# Patient Record
Sex: FEMALE | Race: BLACK OR AFRICAN AMERICAN | NOT HISPANIC OR LATINO | Employment: OTHER | URBAN - METROPOLITAN AREA
[De-identification: names, ages, dates, MRNs, and addresses within clinical notes are randomized per-mention and may not be internally consistent; named-entity substitution may affect disease eponyms.]

---

## 2017-01-12 ENCOUNTER — PRENATAL OFFICE VISIT (OUTPATIENT)
Dept: OBGYN | Facility: CLINIC | Age: 27
End: 2017-01-12
Payer: COMMERCIAL

## 2017-01-12 VITALS
TEMPERATURE: 98 F | WEIGHT: 166 LBS | DIASTOLIC BLOOD PRESSURE: 62 MMHG | HEIGHT: 66 IN | BODY MASS INDEX: 26.68 KG/M2 | HEART RATE: 93 BPM | OXYGEN SATURATION: 98 % | SYSTOLIC BLOOD PRESSURE: 122 MMHG

## 2017-01-12 DIAGNOSIS — Z34.03 ENCOUNTER FOR SUPERVISION OF NORMAL FIRST PREGNANCY IN THIRD TRIMESTER: Primary | ICD-10-CM

## 2017-01-12 PROCEDURE — 99207 ZZC PRENATAL VISIT: CPT | Performed by: OBSTETRICS & GYNECOLOGY

## 2017-01-12 PROCEDURE — 87653 STREP B DNA AMP PROBE: CPT | Performed by: OBSTETRICS & GYNECOLOGY

## 2017-01-12 NOTE — NURSING NOTE
"Chief Complaint   Patient presents with     Prenatal Care       Initial /62 mmHg  Pulse 93  Temp(Src) 98  F (36.7  C) (Oral)  Ht 5' 6\" (1.676 m)  Wt 166 lb (75.297 kg)  BMI 26.81 kg/m2  SpO2 98%  LMP 2016 (Exact Date) Estimated body mass index is 26.81 kg/(m^2) as calculated from the following:    Height as of this encounter: 5' 6\" (1.676 m).    Weight as of this encounter: 166 lb (75.297 kg).  BP completed using cuff size: regular        The following HM Due: NONE      The following patient reported/Care Every where data was sent to:  P ABSTRACT QUALITY INITIATIVES [[id number]]       Rani Hubbard CMA                "

## 2017-01-13 LAB
GP B STREP DNA SPEC QL NAA+PROBE: NORMAL
SPECIMEN SOURCE: NORMAL

## 2017-01-18 ENCOUNTER — PRENATAL OFFICE VISIT (OUTPATIENT)
Dept: OBGYN | Facility: CLINIC | Age: 27
End: 2017-01-18
Payer: COMMERCIAL

## 2017-01-18 VITALS
BODY MASS INDEX: 27.64 KG/M2 | TEMPERATURE: 98.8 F | HEART RATE: 108 BPM | WEIGHT: 172 LBS | HEIGHT: 66 IN | SYSTOLIC BLOOD PRESSURE: 124 MMHG | DIASTOLIC BLOOD PRESSURE: 62 MMHG

## 2017-01-18 DIAGNOSIS — Z34.03 ENCOUNTER FOR SUPERVISION OF NORMAL FIRST PREGNANCY IN THIRD TRIMESTER: Primary | ICD-10-CM

## 2017-01-18 DIAGNOSIS — N89.8 VAGINAL DISCHARGE: ICD-10-CM

## 2017-01-18 DIAGNOSIS — N89.8 VAGINAL ITCHING: ICD-10-CM

## 2017-01-18 LAB
MICRO REPORT STATUS: ABNORMAL
SPECIMEN SOURCE: ABNORMAL
WET PREP SPEC: ABNORMAL

## 2017-01-18 PROCEDURE — 99213 OFFICE O/P EST LOW 20 MIN: CPT | Performed by: ADVANCED PRACTICE MIDWIFE

## 2017-01-18 PROCEDURE — 87210 SMEAR WET MOUNT SALINE/INK: CPT | Performed by: ADVANCED PRACTICE MIDWIFE

## 2017-01-18 PROCEDURE — 99207 ZZC PRENATAL VISIT: CPT | Performed by: ADVANCED PRACTICE MIDWIFE

## 2017-01-18 RX ORDER — FLUCONAZOLE 150 MG/1
150 TABLET ORAL ONCE
Qty: 2 TABLET | Refills: 0 | Status: SHIPPED | OUTPATIENT
Start: 2017-01-18 | End: 2017-01-18

## 2017-01-18 NOTE — PROGRESS NOTES
Feels well, getting ready to be done.  Experiencing yeast symptoms again, see below  Fetal movement: positive  Denies bleeding/lof, no contractions  GBS nega  Vaginal exam done, confirmed vertex and vertex via leopolds   Cx:  Closed/50/-1  Warning signs reviewedt   Return to clinic 1 week      Vaginal Itching note  +Itching and discharge again since last visit x1 week    PELVIC EXAM:  Vulva: No external lesions, BUS WNL  Vagina: Moist, pink, discharge consistent with yeast  well rugated, no lesions  Cervix: smooth, pink, no visible lesions, neg CMT  Uterus: Normal size, anteverted, non-tender, mobile  Ovaries: No mass, non-tender  Rectal exam: deferred    Results for orders placed or performed in visit on 01/18/17   Wet prep   Result Value Ref Range    Specimen Description Vagina     Wet Prep (A)      No clue cells seen  Yeast seen  No Trichomonas seen      Micro Report Status FINAL 01/18/2017        ICD-10-CM    1. Encounter for supervision of normal first pregnancy in third trimester Z34.03    2. Vaginal discharge N89.8 Wet prep     fluconazole (DIFLUCAN) 150 MG tablet   3. Vaginal itching L29.8 Wet prep     fluconazole (DIFLUCAN) 150 MG tablet       Rx sent  Reviewed no scented soaps in vaginal area    DANTE Samuel, TRINI

## 2017-01-18 NOTE — NURSING NOTE
"Chief Complaint   Patient presents with     Prenatal Care       Initial /62 mmHg  Pulse 108  Temp(Src) 98.8  F (37.1  C) (Oral)  Ht 5' 6\" (1.676 m)  Wt 172 lb (78.019 kg)  BMI 27.77 kg/m2  LMP 05/16/2016 (Exact Date)  Breastfeeding? No Estimated body mass index is 27.77 kg/(m^2) as calculated from the following:    Height as of this encounter: 5' 6\" (1.676 m).    Weight as of this encounter: 172 lb (78.019 kg).  BP completed using cuff size: regular. Pt. Here for a PN visit. Pt. C/O lower abd. Pain X1 day, pain is intermittent, rates pain at a 1-2. Also, states that she believes her yeast infection is back, C/O vaginal itching and some whitish vaginal discharge.  ONEIL Marquez RN      "

## 2017-01-19 NOTE — PROGRESS NOTES
Quick Note:    Results discussed directly with patient while patient was present. Any further details documented in the note.   DANTE Kay CNM  ______

## 2017-01-24 ENCOUNTER — PRENATAL OFFICE VISIT (OUTPATIENT)
Dept: OBGYN | Facility: CLINIC | Age: 27
End: 2017-01-24
Payer: COMMERCIAL

## 2017-01-24 VITALS
OXYGEN SATURATION: 100 % | TEMPERATURE: 98.4 F | WEIGHT: 169 LBS | SYSTOLIC BLOOD PRESSURE: 120 MMHG | HEART RATE: 99 BPM | BODY MASS INDEX: 27.29 KG/M2 | DIASTOLIC BLOOD PRESSURE: 68 MMHG

## 2017-01-24 DIAGNOSIS — Z34.03 ENCOUNTER FOR SUPERVISION OF NORMAL FIRST PREGNANCY IN THIRD TRIMESTER: Primary | ICD-10-CM

## 2017-01-24 PROCEDURE — 99207 ZZC PRENATAL VISIT: CPT | Performed by: OBSTETRICS & GYNECOLOGY

## 2017-01-24 NOTE — NURSING NOTE
"Chief Complaint   Patient presents with     Prenatal Care       Initial LMP 2016 (Exact Date) Estimated body mass index is 27.77 kg/(m^2) as calculated from the following:    Height as of 17: 5' 6\" (1.676 m).    Weight as of 17: 172 lb (78.019 kg).  BP completed using cuff size: regular        The following HM Due: NONE      The following patient reported/Care Every where data was sent to:  P ABSTRACT QUALITY INITIATIVES [[id number]]       Pt is having mild contractions  1-2 at a time a couple times a day  Pt states having good movement  37w1d      Rani Hubbard CMA                 "

## 2017-01-24 NOTE — PATIENT INSTRUCTIONS
You can reach your [facility] Care Team any time of the day by calling [phone]. This number will put you in touch with the 24 hour nurse line if the clinic is closed.    To contact your OB/GYN Surgery Scheduler please call [phone]. This is a direct number for your care team between 8 a.m. and 4 p.m. Monday through Friday.    [facility] is open for your convenience: [phone]  Monday through Friday 8 a.m. to 8:30 p.m.  Saturday 9 a.m. to 6 p.m.  Sunday Noon to 6 p.m.    They are closed on all major holidays.

## 2017-01-24 NOTE — PROGRESS NOTES
Thanh is doing well today. No vaginal itching since 2 doses of diflucan last week. Good FM. Mild contractions. No vaginal bleeding or leakage of fluid  /68 mmHg  Pulse 99  Temp(Src) 98.4  F (36.9  C) (Oral)  Wt 169 lb (76.658 kg)  SpO2 100%  LMP 2016 (Exact Date)   See flowsheet    Thanh Douglas is a 26 year old female  at 37w1d by 11w2d US  - GBS negative  - 5 yeast infections this pregnancy, symptoms do resolve briefly with treatment. Consider gentian violet PP if infections persist  - return to clinic in 1 week    Laurel Diggs MD

## 2017-02-01 ENCOUNTER — PRENATAL OFFICE VISIT (OUTPATIENT)
Dept: OBGYN | Facility: CLINIC | Age: 27
End: 2017-02-01
Payer: COMMERCIAL

## 2017-02-01 VITALS
DIASTOLIC BLOOD PRESSURE: 58 MMHG | HEART RATE: 78 BPM | SYSTOLIC BLOOD PRESSURE: 102 MMHG | BODY MASS INDEX: 27.61 KG/M2 | WEIGHT: 171 LBS | OXYGEN SATURATION: 99 %

## 2017-02-01 DIAGNOSIS — Z34.03 ENCOUNTER FOR SUPERVISION OF NORMAL FIRST PREGNANCY IN THIRD TRIMESTER: Primary | ICD-10-CM

## 2017-02-01 PROCEDURE — 99207 ZZC PRENATAL VISIT: CPT | Performed by: ADVANCED PRACTICE MIDWIFE

## 2017-02-01 NOTE — PROGRESS NOTES
Feels well, no complaints, feels like baby has dropped  Feels like yeast symptoms have finally cleared up  Fetal movement: positive  Denies vaginal bleeding/lof, no contractions  Warning signs reviewed   Wishes cervical exam  Denies s/sx of preeclampsia and aware of what to report  Return to clinic 1 week     present for visit    DANTE Samuel, CNM

## 2017-02-01 NOTE — NURSING NOTE
"Chief Complaint   Patient presents with     Prenatal Care   38w2d  States feeling well with no concerns  Initial /58 mmHg  Pulse 78  Wt 171 lb (77.565 kg)  SpO2 99%  LMP 05/16/2016 (Exact Date) Estimated body mass index is 27.61 kg/(m^2) as calculated from the following:    Height as of 1/18/17: 5' 6\" (1.676 m).    Weight as of this encounter: 171 lb (77.565 kg).  BP completed using cuff size: regular  April Vale MA      "

## 2017-02-02 ENCOUNTER — HOSPITAL ENCOUNTER (OUTPATIENT)
Facility: CLINIC | Age: 27
Discharge: HOME | End: 2017-02-02
Attending: OBSTETRICS & GYNECOLOGY | Admitting: OBSTETRICS & GYNECOLOGY
Payer: COMMERCIAL

## 2017-02-02 ENCOUNTER — HOSPITAL ENCOUNTER (OUTPATIENT)
Facility: CLINIC | Age: 27
End: 2017-02-02
Attending: OBSTETRICS & GYNECOLOGY | Admitting: OBSTETRICS & GYNECOLOGY
Payer: COMMERCIAL

## 2017-02-02 ENCOUNTER — OFFICE VISIT (OUTPATIENT)
Dept: URGENT CARE | Facility: URGENT CARE | Age: 27
End: 2017-02-02
Payer: COMMERCIAL

## 2017-02-02 VITALS
TEMPERATURE: 97.9 F | HEART RATE: 97 BPM | OXYGEN SATURATION: 100 % | DIASTOLIC BLOOD PRESSURE: 74 MMHG | SYSTOLIC BLOOD PRESSURE: 124 MMHG

## 2017-02-02 VITALS
RESPIRATION RATE: 16 BRPM | HEIGHT: 66 IN | TEMPERATURE: 97.9 F | WEIGHT: 172 LBS | DIASTOLIC BLOOD PRESSURE: 76 MMHG | SYSTOLIC BLOOD PRESSURE: 113 MMHG | HEART RATE: 71 BPM | BODY MASS INDEX: 27.64 KG/M2

## 2017-02-02 DIAGNOSIS — R10.84 ABDOMINAL PAIN, GENERALIZED: Primary | ICD-10-CM

## 2017-02-02 PROBLEM — R10.9 ABDOMINAL PAIN: Status: ACTIVE | Noted: 2017-02-02

## 2017-02-02 LAB
ALBUMIN UR-MCNC: NEGATIVE MG/DL
APPEARANCE UR: CLEAR
BACTERIA #/AREA URNS HPF: ABNORMAL /HPF
BILIRUB UR QL STRIP: NEGATIVE
COLOR UR AUTO: ABNORMAL
GLUCOSE UR STRIP-MCNC: NEGATIVE MG/DL
HGB UR QL STRIP: NEGATIVE
KETONES UR STRIP-MCNC: NEGATIVE MG/DL
LEUKOCYTE ESTERASE UR QL STRIP: NEGATIVE
MUCOUS THREADS #/AREA URNS LPF: PRESENT /LPF
NITRATE UR QL: NEGATIVE
PH UR STRIP: 5.5 PH (ref 5–7)
RBC #/AREA URNS AUTO: 1 /HPF (ref 0–2)
SP GR UR STRIP: 1.01 (ref 1–1.03)
SQUAMOUS #/AREA URNS AUTO: 3 /HPF (ref 0–1)
URN SPEC COLLECT METH UR: ABNORMAL
UROBILINOGEN UR STRIP-MCNC: NORMAL MG/DL (ref 0–2)
WBC #/AREA URNS AUTO: 3 /HPF (ref 0–2)

## 2017-02-02 PROCEDURE — 99207 ZZC NO BILLABLE SERVICE THIS VISIT: CPT | Performed by: FAMILY MEDICINE

## 2017-02-02 PROCEDURE — 59025 FETAL NON-STRESS TEST: CPT

## 2017-02-02 PROCEDURE — 99214 OFFICE O/P EST MOD 30 MIN: CPT | Mod: 25

## 2017-02-02 PROCEDURE — 25000132 ZZH RX MED GY IP 250 OP 250 PS 637: Performed by: OBSTETRICS & GYNECOLOGY

## 2017-02-02 PROCEDURE — 81001 URINALYSIS AUTO W/SCOPE: CPT | Performed by: OBSTETRICS & GYNECOLOGY

## 2017-02-02 RX ORDER — ONDANSETRON 2 MG/ML
4 INJECTION INTRAMUSCULAR; INTRAVENOUS EVERY 6 HOURS PRN
Status: DISCONTINUED | OUTPATIENT
Start: 2017-02-02 | End: 2017-02-03 | Stop reason: HOSPADM

## 2017-02-02 RX ORDER — ALUMINA, MAGNESIA, AND SIMETHICONE 2400; 2400; 240 MG/30ML; MG/30ML; MG/30ML
30 SUSPENSION ORAL EVERY 4 HOURS PRN
Status: DISCONTINUED | OUTPATIENT
Start: 2017-02-02 | End: 2017-02-03 | Stop reason: HOSPADM

## 2017-02-02 RX ADMIN — ALUMINUM HYDROXIDE, MAGNESIUM HYDROXIDE, AND DIMETHICONE 30 ML: 400; 400; 40 SUSPENSION ORAL at 22:56

## 2017-02-02 NOTE — IP AVS SNAPSHOT
[facility]    [address]    [facility] [address]    Phone:  [phone]    Fax:  [phone]                                       After Visit Summary   2/2/2017    Thanh Douglas    MRN: [id number]           After Visit Summary Signature Page     I have received my discharge instructions, and my questions have been answered. I have discussed any challenges I see with this plan with the nurse or doctor.    ..........................................................................................................................................  Patient/Patient Representative Signature      ..........................................................................................................................................  Patient Representative Print Name and Relationship to Patient    ..................................................               ................................................  Date                                            Time    ..........................................................................................................................................  Reviewed by Signature/Title    ...................................................              ..............................................  Date                                                            Time

## 2017-02-02 NOTE — IP AVS SNAPSHOT
MRN:[id number]                      After Visit Summary   2/2/2017    Thanh Douglas    MRN: [id number]           Thank you!     Thank you for choosing [facility] for your care. Our goal is always to provide you with excellent care. Hearing back from our patients is one way we can continue to improve our services. Please take a few minutes to complete the written survey that you may receive in the mail after you visit. If you would like to speak to someone directly about your visit please contact Patient Relations at [phone]. Thank you!          Patient Information     Date Of Birth          1990        About your hospital stay     You were admitted on:  February 2, 2017 You last received care in the:  [facility]    You were discharged on:  February 2, 2017       Who to Call     For medical emergencies, please call 911.  For non-urgent questions about your medical care, please call your primary care provider or clinic, [phone]          Attending Provider     Provider    Sharon Rose MD       Primary Care Provider Office Phone # Fax #    Sharon Rose [phone]       [address] [address]        Your next 10 appointments already scheduled     Feb 06, 2017  3:30 PM   ESTABLISHED PRENATAL with Laurel Diggs MD, [employer]   [facility] ([facility])    [address] [place] [address] [phone]              Further instructions from your care team       Discharge Instruction for Undelivered Patients      You were seen for: Abdominal Pain  We Consulted: Dr Rose  You had (Test or Medicine): fetal and uterine monitoring, SVE, UA    Diet:   Drink 8 to 12 glasses of liquids (milk, juice, water) every day.  You may eat meals and snacks.     Activity:  Count fetal kicks everyday (see handout)     Call your  "provider if you notice:  Swelling in your face or increased swelling in your hands or legs.  Headaches that are not relieved by Tylenol (acetaminophen).  Changes in your vision (blurring: seeing spots or stars.)  Nausea (sick to your stomach) and vomiting (throwing up).   Weight gain of 5 pounds or more per week.  Heartburn that doesn't go away.  Signs of bladder infection: pain when you urinate (use the toilet), need to go more often and more urgently.  The bag of nichols (rupture of membranes) breaks, or you notice leaking in your underwear.  Bright red blood in your underwear.  Abdominal (lower belly) or stomach pain.  For first baby: Contractions (tightening) less than 5 minutes apart for one hour or more.  Second (plus) baby: Contractions (tightening) less than 10 minutes apart and getting stronger.  *If less than 34 weeks: Contractions (tightenings) more than 6 times in one hour.  Increase or change in vaginal discharge (note the color and amount)      Follow-up:  You can take over the counter heartburn medications for your heartburn.  Follow up in the clinic if pain isnt better or at your next scheduled OB appointment.          Pending Results     No orders found from 2/1/2017 to 2/3/2017.            Admission Information        Provider Department Dept Phone    2/2/2017 Sharon Rose MD  Labor And Delivery [phone]      Your Vitals Were     Blood Pressure Pulse Temperature    113/76 mmHg 71 97.9  F (36.6  C) (Oral)    Respirations Height Weight    16 1.676 m (5' 6\") 78.019 kg (172 lb)    BMI (Body Mass Index) Last Period       27.77 kg/m2 05/16/2016 (Exact Date)       MyChart Information     [email or web address] lets you send messages to your doctor, view your test results, renew your prescriptions, schedule appointments and more. To sign up, go to [email or web address] . Click on \"Log in\" on the left side of the screen, which will take you to the Welcome page. Then click on \"Sign up Now\" on the right side of " the page.     You will be asked to enter the access code listed below, as well as some personal information. Please follow the directions to create your username and password.     Your access code is: [email or web address]  Expires: 2017  3:39 PM     Your access code will  in 90 days. If you need help or a new code, please call your [place] clinic or [phone].        Care EveryWhere ID     This is your Care EveryWhere ID. This could be used by other organizations to access your [facility] medical records  [id number]           Review of your medicines      UNREVIEWED medicines. Ask your doctor about these medicines        Dose / Directions    ferrous sulfate 325 (65 FE) MG tablet   Commonly known as:  IRON   Used for:  Iron deficiency anemia, unspecified iron deficiency anemia type        Dose:  325 mg   Take 1 tablet (325 mg) by mouth daily (with breakfast)   Quantity:  30 tablet   Refills:  2       fluconazole 150 MG tablet   Commonly known as:  DIFLUCAN   Used for:  Candidal vulvovaginitis        Dose:  150 mg   Take 1 tablet (150 mg) by mouth every 3 days Take every 3 days for 3 doses, then once weekly   Quantity:  4 tablet   Refills:  0       * prenatal multivitamin  plus iron 27-0.8 MG Tabs per tablet   Used for:  Pregnancy examination or test, positive result        Dose:  1 tablet   Take 1 tablet by mouth daily   Quantity:  100 tablet   Refills:  3       * prenatal multivitamin  plus iron 27-0.8 MG Tabs per tablet   Used for:  Iron deficiency anemia, unspecified iron deficiency anemia type        Dose:  1 tablet   Take 1 tablet by mouth 2 times daily   Quantity:  100 tablet   Refills:  3       * Notice:  This list has 2 medication(s) that are the same as other medications prescribed for you. Read the directions carefully, and ask your doctor or other care provider to review them with you.             Protect others around you: Learn how to safely use, store and throw away your medicines at  www.disposemymeds.org.             Medication List: This is a list of all your medications and when to take them. Check marks below indicate your daily home schedule. Keep this list as a reference.      Medications           Morning Afternoon Evening Bedtime As Needed    ferrous sulfate 325 (65 FE) MG tablet   Commonly known as:  IRON   Take 1 tablet (325 mg) by mouth daily (with breakfast)                                fluconazole 150 MG tablet   Commonly known as:  DIFLUCAN   Take 1 tablet (150 mg) by mouth every 3 days Take every 3 days for 3 doses, then once weekly                                * prenatal multivitamin  plus iron 27-0.8 MG Tabs per tablet   Take 1 tablet by mouth daily                                * prenatal multivitamin  plus iron 27-0.8 MG Tabs per tablet   Take 1 tablet by mouth 2 times daily                                * Notice:  This list has 2 medication(s) that are the same as other medications prescribed for you. Read the directions carefully, and ask your doctor or other care provider to review them with you.              More Information        Kick Counts  It s normal to worry about your baby s health. One way you can know your baby s doing well is to record the baby s movements once a day. This is called a kick count. You will usually feel your baby move by the 20th week of pregnancy. Remember to take your kick count records to all your appointments with your healthcare provider.       How to Count Kicks    Choose a time when the baby is active, such as after a meal.     Sit comfortably or lie on your side.     The first time the baby moves, write down the time.     Count each movement until the baby has moved 10 times. This can take from 20 minutes to 2 hours.     If the baby hasn t moved 4 times in 1 hour, pat your stomach to wake the baby up.    Write down the time you feel the baby s 10th movement.    Try to do it at the same time each day.  When to Call Your Healthcare  Provider  Call your healthcare provider right away if you notice any of the following:    Your baby moves fewer than 10 times in 2 hours while you re doing kick counts.    Your baby moves much less often than on the days before.    You have not felt your baby move all day.    7401-2090 [employer], [address] [place] [address]. All rights reserved. This information is not intended as a substitute for professional medical care. Always follow your healthcare professional's instructions.            Adapting to Pregnancy: Third Trimester  Although common during pregnancy, some discomforts may seem worse in the final weeks. Simple lifestyle changes can help. Take care of yourself. And ask your partner to help out with small tasks.  Limiting leg problems  Ways to combat leg issues:    Wear support hose all day.    Avoid snug shoes and clothes that bind, like tight pants and socks with elastic tops.    Sit with your feet and legs raised often.  Caring for your breasts  Tips to follow include:    Wash with plain water. Avoid using harsh soaps or rubbing alcohol. They may cause dryness.    Wear a nursing bra for extra support. It can also hide any leaks from your nipples.  Controlling hemorrhoids  Ways to avoid hemorrhoids include:    Eat foods that are high in fiber. Also, exercise and drink enough fluids. This will reduce constipation and hemorrhoids.    Sleep and nap on your side. This limits pressure on the veins of your rectum.    Try not to stand or sit for long periods.  Controlling back pain  As your body changes during pregnancy, your back must work in new ways. Back pain is due to many causes. Physical changes in your body can strain your back and its supporting muscles. Also, hormones (chemicals that carry messages throughout the body) increase during pregnancy. This can affect how your muscles and joints work together. All of these changes can lead to pain. Pain may be felt in the upper or lower  back. Pain is also common in the pelvis. Some pregnant women have sciatica. This is pain caused by pressure on the sciatic nerve running down the back of the leg. Ask your healthcare provider for specific tips and exercises to help control your back pain.  Tips to help you rest    Good rest and sleep will help you feel better. Here are some ideas:    Ask your partner to massage your shoulders, neck, or back.    Limit the errands you do each day.    Lie down in the afternoon or after work for a few minutes.    Take a warm bath before you go to sleep.    Drink warm milk or teas without caffeine.    Avoid coffee, black tea, and cola.  Stopping heartburn    Avoid spicy or acidic foods.    Eat small amounts more often. Eat slowly.   Wait 2 hours after eating before lying down.    Sleep with your upper body raised 6 inches.   Managing mood swings  Ways to manage mood swings include:    Know that mood changes are normal.    Exercise often, but get plenty of rest.    Address any concerns and limit stress. Talking to your partner, other women, or your healthcare provider may help.  Dealing with urinary frequency  Tips to deal with having to urinate often include:    Drink plenty of water all day. If you drink a lot in the evening, though, you may have to get up more in the night.    Limit coffee, black tea, and cola.    9469-9639 [employer]. [address] [place] [address], [place] [address]. All rights reserved. This information is not intended as a substitute for professional medical care. Always follow your healthcare professional's instructions.

## 2017-02-03 NOTE — NURSING NOTE
"Chief Complaint   Patient presents with     Abdominal Pain     38 weeks pregnant first baby , nausea midline pain , very uncomfortable        Initial /74 mmHg  Pulse 97  Temp(Src) 97.9  F (36.6  C)  SpO2 100%  LMP 05/16/2016 (Exact Date) Estimated body mass index is 27.61 kg/(m^2) as calculated from the following:    Height as of 1/18/17: 5' 6\" (1.676 m).    Weight as of 2/1/17: 171 lb (77.565 kg).  BP completed using cuff size: regular      "

## 2017-02-03 NOTE — PROVIDER NOTIFICATION
02/02/17 2232   Provider Notification   Provider Name/Title Dr Rose   Method of Notification Phone   Request Evaluate - Remote   Notification Reason Patient Arrived;Labor Status;Uterine Activity;Pain;Lab/Diagnostic Study;SVE   Comments   Comments ok to give maalox and send home with other medications to take OTC for heartburn   Went over discharge instructions, patient felt better after taking Maalox and sitting up.  She understands the instructions and is ok with going home.

## 2017-02-03 NOTE — PROGRESS NOTES
Pt is 38 weeks pregnant   She has been noticing acute abd pain since today morning   Has no abnormal vaginal bleeding or abnormal vaginal discharge   Ambulance was called and pt was sent to [facility]  Her vitals are WNL

## 2017-02-03 NOTE — PLAN OF CARE
Patient arrived via ambulance.  She was at urgent care and they sent her to us.  She has upper abd pain that she states is a 10/10 for pain.  It has been going on since this afternoon.  Monitors on, assessment done, UA sent, SVE done.

## 2017-02-03 NOTE — DISCHARGE INSTRUCTIONS
Discharge Instruction for Undelivered Patients      You were seen for: Abdominal Pain  We Consulted: Dr Rose  You had (Test or Medicine): fetal and uterine monitoring, SVE, UA    Diet:   Drink 8 to 12 glasses of liquids (milk, juice, water) every day.  You may eat meals and snacks.     Activity:  Count fetal kicks everyday (see handout)     Call your provider if you notice:  Swelling in your face or increased swelling in your hands or legs.  Headaches that are not relieved by Tylenol (acetaminophen).  Changes in your vision (blurring: seeing spots or stars.)  Nausea (sick to your stomach) and vomiting (throwing up).   Weight gain of 5 pounds or more per week.  Heartburn that doesn't go away.  Signs of bladder infection: pain when you urinate (use the toilet), need to go more often and more urgently.  The bag of nichols (rupture of membranes) breaks, or you notice leaking in your underwear.  Bright red blood in your underwear.  Abdominal (lower belly) or stomach pain.  For first baby: Contractions (tightening) less than 5 minutes apart for one hour or more.  Second (plus) baby: Contractions (tightening) less than 10 minutes apart and getting stronger.  *If less than 34 weeks: Contractions (tightenings) more than 6 times in one hour.  Increase or change in vaginal discharge (note the color and amount)      Follow-up:  You can take over the counter heartburn medications for your heartburn.  Follow up in the clinic if pain isnt better or at your next scheduled OB appointment.

## 2017-02-05 PROCEDURE — 59025 FETAL NON-STRESS TEST: CPT | Mod: 26 | Performed by: OBSTETRICS & GYNECOLOGY

## 2017-02-06 ENCOUNTER — PRENATAL OFFICE VISIT (OUTPATIENT)
Dept: OBGYN | Facility: CLINIC | Age: 27
End: 2017-02-06
Payer: COMMERCIAL

## 2017-02-06 VITALS
WEIGHT: 173.4 LBS | RESPIRATION RATE: 16 BRPM | BODY MASS INDEX: 28 KG/M2 | HEART RATE: 92 BPM | DIASTOLIC BLOOD PRESSURE: 74 MMHG | SYSTOLIC BLOOD PRESSURE: 116 MMHG | OXYGEN SATURATION: 98 %

## 2017-02-06 DIAGNOSIS — Z34.03 ENCOUNTER FOR SUPERVISION OF NORMAL FIRST PREGNANCY IN THIRD TRIMESTER: Primary | ICD-10-CM

## 2017-02-06 DIAGNOSIS — B37.31 CANDIDAL VULVOVAGINITIS: ICD-10-CM

## 2017-02-06 PROCEDURE — 99207 ZZC PRENATAL VISIT: CPT | Performed by: OBSTETRICS & GYNECOLOGY

## 2017-02-06 RX ORDER — FLUCONAZOLE 150 MG/1
150 TABLET ORAL
Qty: 4 TABLET | Refills: 0 | Status: SHIPPED | OUTPATIENT
Start: 2017-02-06 | End: 2017-04-04

## 2017-02-06 NOTE — PROGRESS NOTES
Routine OB Visit:    S: +return of vagina discharge and itching. no contractions. Good fetal movement. No LoF, VB.    O: /74 mmHg  Pulse 92  Resp 16  Wt 173 lb 6.4 oz (78.654 kg)  SpO2 98%  LMP 2016 (Exact Date)   Gen: resting comfortably, in NAD  See flowsheet    A: Thanh Douglas is a 26 year old female  at 39w0d, here for routine OB care. Pregnancy c/b recurrent yeast infections    P:   B+/RI. GBS negative. S/p Tdap  Yeast vaginitis symptoms returned.   - fluconazole (DIFLUCAN) 150 MG tablet; Take 1 tablet (150 mg) by mouth every 3 days Take every 3 days for 3 doses, then once weekly  Dispense: 4 tablet; Refill: 0  Return to clinic in 1 week for routine exam. Discussed IOL at 41 wks if she has not yet labored, plan for .  Reviewed labor warning signs.      Laurel Diggs MD  Obstetrics and Gynecology  2017

## 2017-02-13 ENCOUNTER — PRENATAL OFFICE VISIT (OUTPATIENT)
Dept: OBGYN | Facility: CLINIC | Age: 27
End: 2017-02-13
Payer: COMMERCIAL

## 2017-02-13 VITALS
DIASTOLIC BLOOD PRESSURE: 70 MMHG | RESPIRATION RATE: 16 BRPM | SYSTOLIC BLOOD PRESSURE: 120 MMHG | HEART RATE: 86 BPM | WEIGHT: 175.2 LBS | BODY MASS INDEX: 28.28 KG/M2

## 2017-02-13 DIAGNOSIS — Z34.03 ENCOUNTER FOR SUPERVISION OF NORMAL FIRST PREGNANCY IN THIRD TRIMESTER: Primary | ICD-10-CM

## 2017-02-13 PROCEDURE — 99207 ZZC PRENATAL VISIT: CPT | Performed by: OBSTETRICS & GYNECOLOGY

## 2017-02-13 NOTE — NURSING NOTE
"Chief Complaint   Patient presents with     Prenatal Care       Initial /70 (BP Location: Right arm, Patient Position: Chair, Cuff Size: Adult Regular)  Pulse 86  Resp 16  Wt 175 lb 3.2 oz (79.5 kg)  LMP 05/16/2016 (Exact Date)  BMI 28.28 kg/m2 Estimated body mass index is 28.28 kg/(m^2) as calculated from the following:    Height as of 2/2/17: 5' 6\" (1.676 m).    Weight as of this encounter: 175 lb 3.2 oz (79.5 kg).  Medication Reconciliation: unable or not appropriate to perform   40w0d  States feeling good, No concerns.  Having good fetal movements.  Siobhan Atkins LPN      "

## 2017-02-13 NOTE — PROGRESS NOTES
Routine OB Visit:    S: No complaints. no contractions. Good fetal movement. No LoF, VB.    O: /70 (BP Location: Right arm, Patient Position: Chair, Cuff Size: Adult Regular)  Pulse 86  Resp 16  Wt 175 lb 3.2 oz (79.5 kg)  LMP 2016 (Exact Date)  BMI 28.28 kg/m2   Gen: resting comfortably, in NAD  See flowsheet  Cx: ftp/20/-2 posterior, very uncomfortable exam    A: Thanh Douglas is a 26 year old female  at 40w0d, here for routine OB care. Pregnancy c/b recurrent yeast infections    P:   B+/RI. GBS negative. S/p Tdap  Return to clinic in 1 week for routine exam. Discussed IOL at 41 wks if she has not yet labored, plan for . Patient did not want to schedule today  Reviewed labor warning signs.      Laurel Dgigs MD  Obstetrics and Gynecology  2017

## 2017-02-13 NOTE — MR AVS SNAPSHOT
"              After Visit Summary   2/13/2017    Thanh Douglas    MRN: [id number]           Patient Information     Date Of Birth          1990        Visit Information        Provider Department      2/13/2017 4:00 PM Laurel Diggs MD; [employer] [facility]        Today's Diagnoses     Encounter for supervision of normal first pregnancy in third trimester    -  1       Follow-ups after your visit        Your next 10 appointments already scheduled     Feb 22, 2017  3:20 PM CST   ESTABLISHED PRENATAL with DANTE Nash CNM   [facility]    [address] [id number] [phone]              Who to contact     If you have questions or need follow up information about today's clinic visit or your schedule please contact [facility] directly at [phone].  Normal or non-critical lab and imaging results will be communicated to you by MyChart, letter or phone within 4 business days after the clinic has received the results. If you do not hear from us within 7 days, please contact the clinic through MyChart or phone. If you have a critical or abnormal lab result, we will notify you by phone as soon as possible.  Submit refill requests through [employer] or call your pharmacy and they will forward the refill request to us. Please allow 3 business days for your refill to be completed.          Additional Information About Your Visit        [employer] Information     [employer] lets you send messages to your doctor, view your test results, renew your prescriptions, schedule appointments and more. To sign up, go to [email or web address] . Click on \"Log in\" on the left side of the screen, which will take you to the Welcome page. Then click on \"Sign up Now\" on the right side of the page.     You will be asked to enter the access code listed below, as well as some " personal information. Please follow the directions to create your username and password.     Your access code is: [email or web address]  Expires: 2017  4:59 PM     Your access code will  in 90 days. If you need help or a new code, please call your [facility] or [phone].        Care EveryWhere ID     This is your Care EveryWhere ID. This could be used by other organizations to access your [place] medical records  [id number]        Your Vitals Were     Pulse Respirations Last Period BMI (Body Mass Index)          86 16 2016 (Exact Date) 28.28 kg/m2         Blood Pressure from Last 3 Encounters:   17 120/70   17 116/74   17 113/76    Weight from Last 3 Encounters:   17 175 lb 3.2 oz (79.5 kg)   17 173 lb 6.4 oz (78.7 kg)   17 172 lb (78 kg)              Today, you had the following     No orders found for display       Primary Care Provider Office Phone # Fax #    Sharon Rose [phone]       [facility] [address] [facility] [address]        Thank you!     Thank you for choosing [facility]  for your care. Our goal is always to provide you with excellent care. Hearing back from our patients is one way we can continue to improve our services. Please take a few minutes to complete the written survey that you may receive in the mail after your visit with us. Thank you!             Your Updated Medication List - Protect others around you: Learn how to safely use, store and throw away your medicines at www.disposemymeds.org.          This list is accurate as of: 17  4:59 PM.  Always use your most recent med list.                   Brand Name Dispense Instructions for use    ferrous sulfate 325 (65 FE) MG tablet    IRON    30 tablet    Take 1 tablet (325 mg) by mouth daily (with breakfast)       fluconazole 150 MG tablet    DIFLUCAN    4 tablet    Take 1 tablet (150 mg) by mouth every 3 days Take  every 3 days for 3 doses, then once weekly       * prenatal multivitamin  plus iron 27-0.8 MG Tabs per tablet     100 tablet    Take 1 tablet by mouth daily       * prenatal multivitamin  plus iron 27-0.8 MG Tabs per tablet     100 tablet    Take 1 tablet by mouth 2 times daily       * Notice:  This list has 2 medication(s) that are the same as other medications prescribed for you. Read the directions carefully, and ask your doctor or other care provider to review them with you.

## 2017-02-20 ENCOUNTER — ANESTHESIA (OUTPATIENT)
Dept: OBGYN | Facility: CLINIC | Age: 27
End: 2017-02-20
Payer: COMMERCIAL

## 2017-02-20 ENCOUNTER — ANESTHESIA EVENT (OUTPATIENT)
Dept: OBGYN | Facility: CLINIC | Age: 27
End: 2017-02-20
Payer: COMMERCIAL

## 2017-02-20 ENCOUNTER — HOSPITAL ENCOUNTER (INPATIENT)
Facility: CLINIC | Age: 27
LOS: 2 days | Discharge: HOME | End: 2017-02-22
Attending: OBSTETRICS & GYNECOLOGY | Admitting: OBSTETRICS & GYNECOLOGY
Payer: COMMERCIAL

## 2017-02-20 LAB
ABO + RH BLD: NORMAL
ABO + RH BLD: NORMAL
SPECIMEN EXP DATE BLD: NORMAL
T PALLIDUM IGG+IGM SER QL: NEGATIVE

## 2017-02-20 PROCEDURE — 40000671 ZZH STATISTIC ANESTHESIA CASE

## 2017-02-20 PROCEDURE — 12000031 ZZH R&B OB CRITICAL

## 2017-02-20 PROCEDURE — 3E0R3CZ INTRODUCTION OF REGIONAL ANESTHETIC INTO SPINAL CANAL, PERCUTANEOUS APPROACH: ICD-10-PCS | Performed by: ANESTHESIOLOGY

## 2017-02-20 PROCEDURE — 25000125 ZZHC RX 250: Performed by: OBSTETRICS & GYNECOLOGY

## 2017-02-20 PROCEDURE — 25800025 ZZH RX 258: Performed by: OBSTETRICS & GYNECOLOGY

## 2017-02-20 PROCEDURE — 25000128 H RX IP 250 OP 636: Performed by: ANESTHESIOLOGY

## 2017-02-20 PROCEDURE — 86780 TREPONEMA PALLIDUM: CPT | Performed by: OBSTETRICS & GYNECOLOGY

## 2017-02-20 PROCEDURE — 00HU33Z INSERTION OF INFUSION DEVICE INTO SPINAL CANAL, PERCUTANEOUS APPROACH: ICD-10-PCS | Performed by: ANESTHESIOLOGY

## 2017-02-20 PROCEDURE — 0HQ9XZZ REPAIR PERINEUM SKIN, EXTERNAL APPROACH: ICD-10-PCS | Performed by: OBSTETRICS & GYNECOLOGY

## 2017-02-20 PROCEDURE — 99215 OFFICE O/P EST HI 40 MIN: CPT

## 2017-02-20 PROCEDURE — 37000011 ZZH ANESTHESIA WARD SERVICE

## 2017-02-20 PROCEDURE — 25900017 H RX MED GY IP 259 OP 259 PS 637: Performed by: OBSTETRICS & GYNECOLOGY

## 2017-02-20 PROCEDURE — 86901 BLOOD TYPING SEROLOGIC RH(D): CPT | Performed by: OBSTETRICS & GYNECOLOGY

## 2017-02-20 PROCEDURE — 25000128 H RX IP 250 OP 636: Performed by: OBSTETRICS & GYNECOLOGY

## 2017-02-20 PROCEDURE — 59400 OBSTETRICAL CARE: CPT | Performed by: OBSTETRICS & GYNECOLOGY

## 2017-02-20 PROCEDURE — 86900 BLOOD TYPING SEROLOGIC ABO: CPT | Performed by: OBSTETRICS & GYNECOLOGY

## 2017-02-20 RX ORDER — NALOXONE HYDROCHLORIDE 0.4 MG/ML
.1-.4 INJECTION, SOLUTION INTRAMUSCULAR; INTRAVENOUS; SUBCUTANEOUS
Status: DISCONTINUED | OUTPATIENT
Start: 2017-02-20 | End: 2017-02-21

## 2017-02-20 RX ORDER — MISOPROSTOL 100 UG/1
25 TABLET ORAL ONCE
Status: COMPLETED | OUTPATIENT
Start: 2017-02-20 | End: 2017-02-20

## 2017-02-20 RX ORDER — METHYLERGONOVINE MALEATE 0.2 MG/ML
200 INJECTION INTRAVENOUS
Status: DISCONTINUED | OUTPATIENT
Start: 2017-02-20 | End: 2017-02-21

## 2017-02-20 RX ORDER — OXYCODONE AND ACETAMINOPHEN 5; 325 MG/1; MG/1
1 TABLET ORAL
Status: DISCONTINUED | OUTPATIENT
Start: 2017-02-20 | End: 2017-02-21

## 2017-02-20 RX ORDER — EPHEDRINE SULFATE 50 MG/ML
5 INJECTION, SOLUTION INTRAMUSCULAR; INTRAVENOUS; SUBCUTANEOUS
Status: DISCONTINUED | OUTPATIENT
Start: 2017-02-20 | End: 2017-02-21

## 2017-02-20 RX ORDER — HYDROMORPHONE HYDROCHLORIDE 1 MG/ML
0.5 INJECTION, SOLUTION INTRAMUSCULAR; INTRAVENOUS; SUBCUTANEOUS ONCE
Status: COMPLETED | OUTPATIENT
Start: 2017-02-20 | End: 2017-02-20

## 2017-02-20 RX ORDER — ONDANSETRON 2 MG/ML
4 INJECTION INTRAMUSCULAR; INTRAVENOUS EVERY 6 HOURS PRN
Status: DISCONTINUED | OUTPATIENT
Start: 2017-02-20 | End: 2017-02-21

## 2017-02-20 RX ORDER — NALBUPHINE HYDROCHLORIDE 10 MG/ML
2.5-5 INJECTION, SOLUTION INTRAMUSCULAR; INTRAVENOUS; SUBCUTANEOUS EVERY 6 HOURS PRN
Status: DISCONTINUED | OUTPATIENT
Start: 2017-02-20 | End: 2017-02-21

## 2017-02-20 RX ORDER — OXYTOCIN 10 [USP'U]/ML
10 INJECTION, SOLUTION INTRAMUSCULAR; INTRAVENOUS
Status: DISCONTINUED | OUTPATIENT
Start: 2017-02-20 | End: 2017-02-21

## 2017-02-20 RX ORDER — CARBOPROST TROMETHAMINE 250 UG/ML
250 INJECTION, SOLUTION INTRAMUSCULAR
Status: DISCONTINUED | OUTPATIENT
Start: 2017-02-20 | End: 2017-02-21

## 2017-02-20 RX ORDER — FENTANYL CITRATE 50 UG/ML
50-100 INJECTION, SOLUTION INTRAMUSCULAR; INTRAVENOUS
Status: DISCONTINUED | OUTPATIENT
Start: 2017-02-20 | End: 2017-02-21

## 2017-02-20 RX ORDER — SODIUM CHLORIDE, SODIUM LACTATE, POTASSIUM CHLORIDE, CALCIUM CHLORIDE 600; 310; 30; 20 MG/100ML; MG/100ML; MG/100ML; MG/100ML
INJECTION, SOLUTION INTRAVENOUS CONTINUOUS
Status: DISCONTINUED | OUTPATIENT
Start: 2017-02-20 | End: 2017-02-21

## 2017-02-20 RX ORDER — ACETAMINOPHEN 325 MG/1
650 TABLET ORAL EVERY 4 HOURS PRN
Status: DISCONTINUED | OUTPATIENT
Start: 2017-02-20 | End: 2017-02-21

## 2017-02-20 RX ORDER — OXYTOCIN/0.9 % SODIUM CHLORIDE 30/500 ML
100-340 PLASTIC BAG, INJECTION (ML) INTRAVENOUS CONTINUOUS PRN
Status: COMPLETED | OUTPATIENT
Start: 2017-02-20 | End: 2017-02-21

## 2017-02-20 RX ORDER — LIDOCAINE 40 MG/G
CREAM TOPICAL
Status: DISCONTINUED | OUTPATIENT
Start: 2017-02-20 | End: 2017-02-21

## 2017-02-20 RX ORDER — FENTANYL CITRATE 50 UG/ML
100 INJECTION, SOLUTION INTRAMUSCULAR; INTRAVENOUS ONCE
Status: DISCONTINUED | OUTPATIENT
Start: 2017-02-20 | End: 2017-02-21

## 2017-02-20 RX ORDER — OXYTOCIN/0.9 % SODIUM CHLORIDE 30/500 ML
1-24 PLASTIC BAG, INJECTION (ML) INTRAVENOUS CONTINUOUS
Status: DISCONTINUED | OUTPATIENT
Start: 2017-02-20 | End: 2017-02-21

## 2017-02-20 RX ORDER — ONDANSETRON 4 MG/1
4 TABLET, ORALLY DISINTEGRATING ORAL EVERY 6 HOURS PRN
Status: DISCONTINUED | OUTPATIENT
Start: 2017-02-20 | End: 2017-02-21

## 2017-02-20 RX ORDER — IBUPROFEN 800 MG/1
800 TABLET, FILM COATED ORAL
Status: COMPLETED | OUTPATIENT
Start: 2017-02-20 | End: 2017-02-21

## 2017-02-20 RX ADMIN — Medication: at 19:53

## 2017-02-20 RX ADMIN — FENTANYL CITRATE 100 MCG: 50 INJECTION INTRAMUSCULAR; INTRAVENOUS at 14:38

## 2017-02-20 RX ADMIN — FENTANYL CITRATE 100 MCG: 50 INJECTION INTRAMUSCULAR; INTRAVENOUS at 15:48

## 2017-02-20 RX ADMIN — SODIUM CHLORIDE, POTASSIUM CHLORIDE, SODIUM LACTATE AND CALCIUM CHLORIDE: 600; 310; 30; 20 INJECTION, SOLUTION INTRAVENOUS at 21:19

## 2017-02-20 RX ADMIN — HYDROMORPHONE HYDROCHLORIDE 0.5 MG: 10 INJECTION, SOLUTION INTRAMUSCULAR; INTRAVENOUS; SUBCUTANEOUS at 19:47

## 2017-02-20 RX ADMIN — Medication 25 MCG: at 10:23

## 2017-02-20 RX ADMIN — SODIUM CHLORIDE, POTASSIUM CHLORIDE, SODIUM LACTATE AND CALCIUM CHLORIDE 1000 ML: 600; 310; 30; 20 INJECTION, SOLUTION INTRAVENOUS at 16:32

## 2017-02-20 RX ADMIN — ONDANSETRON 4 MG: 2 INJECTION INTRAMUSCULAR; INTRAVENOUS at 12:52

## 2017-02-20 RX ADMIN — SODIUM CHLORIDE, POTASSIUM CHLORIDE, SODIUM LACTATE AND CALCIUM CHLORIDE: 600; 310; 30; 20 INJECTION, SOLUTION INTRAVENOUS at 13:41

## 2017-02-20 RX ADMIN — OXYTOCIN-SODIUM CHLORIDE 0.9% IV SOLN 30 UNIT/500ML 2 MILLI-UNITS/MIN: 30-0.9/5 SOLUTION at 14:43

## 2017-02-20 NOTE — IP AVS SNAPSHOT
[facility]    [address]    [facility] [address]    Phone:  [phone]    Fax:  [phone]                                       After Visit Summary   2/20/2017    Thanh Douglas    MRN: [id number]           After Visit Summary Signature Page     I have received my discharge instructions, and my questions have been answered. I have discussed any challenges I see with this plan with the nurse or doctor.    ..........................................................................................................................................  Patient/Patient Representative Signature      ..........................................................................................................................................  Patient Representative Print Name and Relationship to Patient    ..................................................               ................................................  Date                                            Time    ..........................................................................................................................................  Reviewed by Signature/Title    ...................................................              ..............................................  Date                                                            Time

## 2017-02-20 NOTE — PLAN OF CARE
Patient arrives to L&D unit to rule out labor and rupture of membranes. She states her water broke about 0430 this am for clear pink fluid. She denies any strong contractions at this point. External monitors applied per patient's permission. History and physical assessment completed. Dr. Dixon called for orders.

## 2017-02-20 NOTE — PROVIDER NOTIFICATION
02/20/17 0550   Provider Notification   Provider Name/Title Dr. Rose   Method of Notification Phone   Request Evaluate in Person   Notification Reason Patient Arrived;Labor Status;Uterine Activity;Pain;Status Update;SVE   MD called to update that patient came in through the ER to rule out labor and rule rupture of membranes. Updated MD that contractions are 6 minutes apart and palpating very mild, ruptured at home about 0420 for clear pink fluid. FHT's started off min variability, now looking more moderate variability. Received orders to admit patient for labor and Dr. Barahona will be rounding today.

## 2017-02-20 NOTE — IP AVS SNAPSHOT
MRN:[id number]                      After Visit Summary   2/20/2017    Thanh Douglas    MRN: [id number]           Thank you!     Thank you for choosing [facility] for your care. Our goal is always to provide you with excellent care. Hearing back from our patients is one way we can continue to improve our services. Please take a few minutes to complete the written survey that you may receive in the mail after you visit. If you would like to speak to someone directly about your visit please contact Patient Relations at [phone]. Thank you!          Patient Information     Date Of Birth          1990        About your hospital stay     You were admitted on:  February 20, 2017 You last received care in the:  [facility] Postpartum    You were discharged on:  February 22, 2017       Who to Call     For medical emergencies, please call 911.  For non-urgent questions about your medical care, please call your primary care provider or clinic, [phone]          Attending Provider     Provider Specialty    Sharon Rose MD OB/Gyn    Mandeep Barahona MD OB/Gyn    Laurel Diggs MD OB/Gyn       Primary Care Provider Office Phone # Fax #    Sharon Rose [phone]       [address] [address]        Your next 10 appointments already scheduled     Feb 22, 2017  3:20 PM CST   ESTABLISHED PRENATAL with DANTE Nash CNM   [facility]    [address] [place] [id number] [phone]              Further instructions from your care team       Postpartum Vaginal Delivery Instructions    Activity       Ask family and friends for help when you need it.    Do not place anything in your vagina for 6 weeks.    You are not restricted on other activities, but take it easy for a few weeks to allow your body to recover from delivery.  You are  able to do any activities you feel up to that point.    No driving until you have stopped taking your pain medications (usually two weeks after delivery).     Call your health care provider if you have any of these symptoms:       Increased pain, swelling, redness, or fluid around your stiches from an episiotomy or perineal tear.    A fever above 100.4 F (38 C) with or without chills when placing a thermometer under your tongue.    You soak a sanitary pad with blood within 1 hour, or you see blood clots larger than a golf ball.    Bleeding that lasts more than 6 weeks.    Vaginal discharge that smells bad.    Severe pain, cramping or tenderness in your lower belly area.    A need to urinate more frequently (use the toilet more often), more urgently (use the toilet very quickly), or it burns when you urinate.    Nausea and vomiting.    Redness, swelling or pain around a vein in your leg.    Problems breastfeeding or a red or painful area on your breast. Lactation: [phone]    Chest pain and cough or are gasping for air.    Problems coping with sadness, anxiety, or depression.  If you have any concerns about hurting yourself or the baby, call your provider immediately.     You have questions or concerns after you return home.     Keep your hands clean:  Always wash your hands before touching your perineal area and stitches.  This helps reduce your risk of infection.  If your hands aren't dirty, you may use an alcohol hand-rub to clean your hands. Keep your nails clean and short.        Pending Results     No orders found from 2/18/2017 to 2/21/2017.            Statement of Approval     Ordered          02/22/17 1142  I have reviewed and agree with all the recommendations and orders detailed in this document.  EFFECTIVE NOW     Approved and electronically signed by:  James Harris MD             Admission Information     Date & Time Department Dept. Phone    2/20/2017 [facility] Postpartum [phone]  "     Your Vitals Were     Blood Pressure Pulse Temperature Respirations Height Weight    108/64 (BP Location: Left arm) 82 98.6  F (37  C) (Oral) 18 1.676 m (5' 6\") 79.4 kg (175 lb)    Last Period Pulse Oximetry BMI (Body Mass Index)             2016 (Exact Date) 97% 28.25 kg/m2         [employer] Information     [employer] lets you send messages to your doctor, view your test results, renew your prescriptions, schedule appointments and more. To sign up, go to [email or web address] . Click on \"Log in\" on the left side of the screen, which will take you to the Welcome page. Then click on \"Sign up Now\" on the right side of the page.     You will be asked to enter the access code listed below, as well as some personal information. Please follow the directions to create your username and password.     Your access code is: [email or web address]  Expires: 2017  4:59 PM     Your access code will  in 90 days. If you need help or a new code, please call your [place] clinic or [phone].        Care EveryWhere ID     This is your Care EveryWhere ID. This could be used by other organizations to access your [place] medical records  [id number]           Review of your medicines      UNREVIEWED medicines. Ask your doctor about these medicines        Dose / Directions    ferrous sulfate 325 (65 FE) MG tablet   Commonly known as:  IRON   Used for:  Iron deficiency anemia, unspecified iron deficiency anemia type        Dose:  325 mg   Take 1 tablet (325 mg) by mouth daily (with breakfast)   Quantity:  30 tablet   Refills:  2       fluconazole 150 MG tablet   Commonly known as:  DIFLUCAN   Used for:  Candidal vulvovaginitis        Dose:  150 mg   Take 1 tablet (150 mg) by mouth every 3 days Take every 3 days for 3 doses, then once weekly   Quantity:  4 tablet   Refills:  0       * prenatal multivitamin  plus iron 27-0.8 MG Tabs per tablet   Used for:  Pregnancy examination or test, positive result        Dose:  1 tablet "   Take 1 tablet by mouth daily   Quantity:  100 tablet   Refills:  3       * prenatal multivitamin  plus iron 27-0.8 MG Tabs per tablet   Used for:  Iron deficiency anemia, unspecified iron deficiency anemia type        Dose:  1 tablet   Take 1 tablet by mouth 2 times daily   Quantity:  100 tablet   Refills:  3       * Notice:  This list has 2 medication(s) that are the same as other medications prescribed for you. Read the directions carefully, and ask your doctor or other care provider to review them with you.      START taking        Dose / Directions    ibuprofen 400 MG tablet   Commonly known as:  ADVIL/MOTRIN        Dose:  400-800 mg   Take 1-2 tablets (400-800 mg) by mouth every 6 hours as needed for other (cramping)   Quantity:  120 tablet   Refills:  0            Where to get your medicines      These medications were sent to [facility] [address] [id number] [facility] [address]     Phone:  [phone]     ibuprofen 400 MG tablet                Protect others around you: Learn how to safely use, store and throw away your medicines at www.disposemymeds.org.             Medication List: This is a list of all your medications and when to take them. Check marks below indicate your daily home schedule. Keep this list as a reference.      Medications           Morning Afternoon Evening Bedtime As Needed    ferrous sulfate 325 (65 FE) MG tablet   Commonly known as:  IRON   Take 1 tablet (325 mg) by mouth daily (with breakfast)                                fluconazole 150 MG tablet   Commonly known as:  DIFLUCAN   Take 1 tablet (150 mg) by mouth every 3 days Take every 3 days for 3 doses, then once weekly                                ibuprofen 400 MG tablet   Commonly known as:  ADVIL/MOTRIN   Take 1-2 tablets (400-800 mg) by mouth every 6 hours as needed for other (cramping)   Last time this was given:  800 mg on 2/22/2017 10:00 AM                                 * prenatal multivitamin  plus iron 27-0.8 MG Tabs per tablet   Take 1 tablet by mouth daily   Last time this was given:  1 tablet on 2/22/2017  8:42 AM                                * prenatal multivitamin  plus iron 27-0.8 MG Tabs per tablet   Take 1 tablet by mouth 2 times daily   Last time this was given:  1 tablet on 2/22/2017  8:42 AM                                * Notice:  This list has 2 medication(s) that are the same as other medications prescribed for you. Read the directions carefully, and ask your doctor or other care provider to review them with you.

## 2017-02-20 NOTE — PROVIDER NOTIFICATION
02/20/17 0824   Provider Notification   Provider Name/Title Dr. Barahona   Method of Notification In Department       Discussing plan of care with patient and . Continue to monitor until 1000. Patient urged to be more mobile. If unchanged at 1000 labor augmentation suggested. Patient and spouse agreeable with plan.

## 2017-02-20 NOTE — PROVIDER NOTIFICATION
02/20/17 1001   Provider Notification   Provider Name/Title Dr. Barahona   Method of Notification Electronic Page     MD updated: SVE: 1/60/-2, fishman score of 5. Orders for one dose of oral cytotec for cervical ripening at this time. Update MD as needed. Pain meds OK.

## 2017-02-20 NOTE — PROVIDER NOTIFICATION
02/20/17 1237   Provider Notification   Provider Name/Title Dr. Barahona   Method of Notification Electronic Page     Updated MD, SVE now 3/70/-2. Contractions still irregular 5-8 minutes apart, but palpating stronger and reported more painful per patient. Patient does not want pitocin at this time, ok per MD. Orders to re-check at 1430, begin pitocin if SVE unchanged.

## 2017-02-21 LAB — HGB BLD-MCNC: 10.3 G/DL (ref 11.7–15.7)

## 2017-02-21 PROCEDURE — 25000125 ZZHC RX 250: Performed by: OBSTETRICS & GYNECOLOGY

## 2017-02-21 PROCEDURE — 85018 HEMOGLOBIN: CPT | Performed by: OBSTETRICS & GYNECOLOGY

## 2017-02-21 PROCEDURE — 12000027 ZZH R&B OB

## 2017-02-21 PROCEDURE — 25000132 ZZH RX MED GY IP 250 OP 250 PS 637: Performed by: OBSTETRICS & GYNECOLOGY

## 2017-02-21 PROCEDURE — 72200001 ZZH LABOR CARE VAGINAL DELIVERY SINGLE

## 2017-02-21 PROCEDURE — 36415 COLL VENOUS BLD VENIPUNCTURE: CPT | Performed by: OBSTETRICS & GYNECOLOGY

## 2017-02-21 PROCEDURE — 25000128 H RX IP 250 OP 636: Performed by: ANESTHESIOLOGY

## 2017-02-21 RX ORDER — PRENATAL VIT/IRON FUM/FOLIC AC 27MG-0.8MG
1 TABLET ORAL DAILY
Status: DISCONTINUED | OUTPATIENT
Start: 2017-02-21 | End: 2017-02-22 | Stop reason: HOSPADM

## 2017-02-21 RX ORDER — IBUPROFEN 400 MG/1
400-800 TABLET, FILM COATED ORAL EVERY 6 HOURS PRN
Status: DISCONTINUED | OUTPATIENT
Start: 2017-02-21 | End: 2017-02-22 | Stop reason: HOSPADM

## 2017-02-21 RX ORDER — AMOXICILLIN 250 MG
1-2 CAPSULE ORAL 2 TIMES DAILY
Status: DISCONTINUED | OUTPATIENT
Start: 2017-02-21 | End: 2017-02-22 | Stop reason: HOSPADM

## 2017-02-21 RX ORDER — OXYTOCIN/0.9 % SODIUM CHLORIDE 30/500 ML
100 PLASTIC BAG, INJECTION (ML) INTRAVENOUS CONTINUOUS
Status: DISCONTINUED | OUTPATIENT
Start: 2017-02-21 | End: 2017-02-22 | Stop reason: HOSPADM

## 2017-02-21 RX ORDER — OXYTOCIN/0.9 % SODIUM CHLORIDE 30/500 ML
340 PLASTIC BAG, INJECTION (ML) INTRAVENOUS CONTINUOUS PRN
Status: DISCONTINUED | OUTPATIENT
Start: 2017-02-21 | End: 2017-02-22 | Stop reason: HOSPADM

## 2017-02-21 RX ORDER — BISACODYL 10 MG
10 SUPPOSITORY, RECTAL RECTAL DAILY PRN
Status: DISCONTINUED | OUTPATIENT
Start: 2017-02-23 | End: 2017-02-22 | Stop reason: HOSPADM

## 2017-02-21 RX ORDER — MISOPROSTOL 200 UG/1
400 TABLET ORAL
Status: DISCONTINUED | OUTPATIENT
Start: 2017-02-21 | End: 2017-02-22 | Stop reason: HOSPADM

## 2017-02-21 RX ORDER — OXYTOCIN 10 [USP'U]/ML
10 INJECTION, SOLUTION INTRAMUSCULAR; INTRAVENOUS
Status: DISCONTINUED | OUTPATIENT
Start: 2017-02-21 | End: 2017-02-22 | Stop reason: HOSPADM

## 2017-02-21 RX ORDER — ACETAMINOPHEN 325 MG/1
650 TABLET ORAL EVERY 4 HOURS PRN
Status: DISCONTINUED | OUTPATIENT
Start: 2017-02-21 | End: 2017-02-22 | Stop reason: HOSPADM

## 2017-02-21 RX ORDER — NALOXONE HYDROCHLORIDE 0.4 MG/ML
.1-.4 INJECTION, SOLUTION INTRAMUSCULAR; INTRAVENOUS; SUBCUTANEOUS
Status: DISCONTINUED | OUTPATIENT
Start: 2017-02-21 | End: 2017-02-22 | Stop reason: HOSPADM

## 2017-02-21 RX ORDER — LANOLIN 100 %
OINTMENT (GRAM) TOPICAL
Status: DISCONTINUED | OUTPATIENT
Start: 2017-02-21 | End: 2017-02-22 | Stop reason: HOSPADM

## 2017-02-21 RX ORDER — HYDROCORTISONE 2.5 %
CREAM (GRAM) TOPICAL 3 TIMES DAILY PRN
Status: DISCONTINUED | OUTPATIENT
Start: 2017-02-21 | End: 2017-02-22 | Stop reason: HOSPADM

## 2017-02-21 RX ADMIN — IBUPROFEN 800 MG: 400 TABLET ORAL at 08:36

## 2017-02-21 RX ADMIN — IBUPROFEN 800 MG: 400 TABLET ORAL at 15:26

## 2017-02-21 RX ADMIN — PRENATAL VIT W/ FE FUMARATE-FA TAB 27-0.8 MG 1 TABLET: 27-0.8 TAB at 08:36

## 2017-02-21 RX ADMIN — IBUPROFEN 800 MG: 800 TABLET, FILM COATED ORAL at 00:40

## 2017-02-21 RX ADMIN — OXYTOCIN-SODIUM CHLORIDE 0.9% IV SOLN 30 UNIT/500ML 340 ML/HR: 30-0.9/5 SOLUTION at 00:04

## 2017-02-21 RX ADMIN — SENNOSIDES AND DOCUSATE SODIUM 1 TABLET: 8.6; 5 TABLET ORAL at 20:51

## 2017-02-21 RX ADMIN — ONDANSETRON 4 MG: 2 INJECTION INTRAMUSCULAR; INTRAVENOUS at 01:51

## 2017-02-21 RX ADMIN — SENNOSIDES AND DOCUSATE SODIUM 1 TABLET: 8.6; 5 TABLET ORAL at 08:36

## 2017-02-21 RX ADMIN — ACETAMINOPHEN 650 MG: 325 TABLET, FILM COATED ORAL at 18:33

## 2017-02-21 NOTE — PLAN OF CARE
Problem: Goal Outcome Summary  Goal: Goal Outcome Summary  Patient up ad arcelia in room, voiding without difficulty. Pain controlled with use of oral pain medications.  at bedside interpreting for patient.

## 2017-02-21 NOTE — PROGRESS NOTES
[facility] Obstetrics Post-Partum Progress Note          Assessment and Plan:    Assessment:   Post-partum day #1  Normal spontaneous vaginal delivery  L&D complications: None      Doing well.  No excessive bleeding  Pain well-controlled.      Plan:   Ambulation encouraged  Hgb today  Breast feeding strategies discussed  Anticipate discharge tomorrow           Interval History:   Doing well.  Pain is well-controlled.  No fevers.  No history of foul-smelling vaginal discharge.  Good appetite.  Denies chest pain, shortness of breath, nausea or vomiting.  Vaginal bleeding is similar to a heavy menstrual flow.  Ambulatory.  Breastfeeding well.          Significant Problems:      Patient Active Problem List   Diagnosis     Encounter for supervision of normal first pregnancy in third trimester     Abdominal pain     Indication for care in labor or delivery     Labor and delivery indication for care or intervention     Vaginal delivery             Review of Systems:    The patient denies any chest pain, shortness of breath, excessive pain, fever, chills, purulent drainage from the wound, nausea or vomiting.          Medications:   All medications related to the patient's surgery have been reviewed          Physical Exam:     Patient Vitals for the past 12 hrs:   BP Temp Temp src Pulse Resp   02/21/17 0840 118/82 98.7  F (37.1  C) Oral 92 18   02/21/17 0525 109/83 98.3  F (36.8  C) Oral 98 18   02/21/17 0150 131/79 - - - 18   02/21/17 0135 120/70 - - - 18   02/21/17 0120 120/72 - - - 18   02/21/17 0105 128/82 - - - 18   02/21/17 0050 128/75 - - - 18   02/21/17 0035 113/56 - - - 18   02/21/17 0020 103/51 - - - 18   02/21/17 0000 104/53 98.8  F (37.1  C) Oral - 18   02/20/17 2230 112/58 98.4  F (36.9  C) Oral - 16   02/20/17 2210 106/53 - - - 16   02/20/17 2130 96/53 - - - 16     Uterine fundus is firm, non-tender and at the level of the umbilicus          Data:     Hemoglobin   Date Value Ref Range Status   11/22/2016  10.4 (L) 11.7 - 15.7 g/dL Final     No imaging studies have been ordered    Laurel Diggs MD

## 2017-02-21 NOTE — PROVIDER NOTIFICATION
02/20/17 2128   Provider Notification   Provider Name/Title Dr Barahona   Method of Notification In Department   Request Evaluate - Remote     MD notified of SVE and that pt is comfortable with epidural. Updated that we will recheck pt at 2200 if she has not yet felt any pressure. He is okay with this plan and will be in the hospital . Call when needed.

## 2017-02-21 NOTE — PROVIDER NOTIFICATION
02/20/17 1905   Provider Notification   Provider Name/Title Dr Barahona   Method of Notification In Department   Request Evaluate - Remote     MD in department and updated on SVE and that pt will be getting epidural. Ok with this plan. Call if needed.

## 2017-02-21 NOTE — PROVIDER NOTIFICATION
02/20/17 1938   Provider Notification   Provider Name/Title Dr Dean   Method of Notification At Bedside     MDA at bedside for epidural placement.

## 2017-02-21 NOTE — ADDENDUM NOTE
Addendum  created 02/21/17 0254 by Poli Dean MD    Anesthesia Staff edited, Procedure Event Log accessed

## 2017-02-21 NOTE — ANESTHESIA PROCEDURE NOTES
Peripheral nerve/Neuraxial procedure note : epidural catheter  Pre-Procedure  Performed by SALOME HOANG  Location: OB, floor    Procedure Times:2/20/2017 7:33 PM and 2/20/2017 7:52 PM  Pre-Anesthestic Checklist: patient identified, IV checked, risks and benefits discussed, informed consent, monitors and equipment checked, pre-op evaluation and at physician/surgeon's request    Timeout  Correct Patient: Yes   Correct Procedure: Yes   Correct Site: Yes   Correct Laterality: N/A   Correct Position: Yes   Site Marked: No   .   Procedure Documentation  ASA 2  .    Procedure:    Epidural catheter.  Insertion Site:L3-4  (midline approach) Injection technique: LORT saline   Local skin infiltrated with 3 mL of 1% lidocaine.  LANDEN at 5 cm     Patient Prep;mask, sterile gloves, povidone-iodine 7.5% surgical scrub, patient draped.  .  Needle: Inga Hernandez (17 G. 3.5 in). # of attempts: 1. # of redirects:.  Spinal Needle: . . . Catheter: 19 G .  .  .  Catheter threaded easily, 10 at the skin and 5 cm in the epidural space.     Assessment/Narrative  Paresthesias: No.  .  .  Aspiration negative for heme or CSF  . Test dose of 5 mL lidocaine 1.5% w/ 1:200,000 epinephrine at 19:47.  Test dose negative for signs of intravascular, subdural or intrathecal injection. Comments:  I or my partner am immediately available. I or my partner will monitor the patient and supervise nursing care at necessary intervals.    Given 10 ml 0.125% bupivicaine infusion with 0.5 mg hydromorphone.

## 2017-02-21 NOTE — PROCEDURES
Delivery Summary    Thanh Douglas MRN# [id number]   Age: 26 year old YOB: 1990     Labor Event Times:    Labor Onset Date       Labor Onset Time    Dilation Complete Date    Dilation Complete Time       Start Pushing Date        Start Pushing Time            Labor Length:    1st Stage (hrs/min) 2.00 55.00   2nd Stage (hrs/min) 2.00 17.00   3rd Stage (hrs/min) 0.00 7.00       Labor Events:     Labor No   Rupture Date     Rupture Time     Rupture Type Spontaneous rupture of membranes occuring during spontaneous labor or augmentation   Fluid Color     Labor Type     Induction    Induction Indication         Augmentation    Labor Complications     Additional Complications     Management of Labor        Antibiotics     IV Antibiotic Given     Additional Management     Fetal Status Prior to  Delivery     Fetal Status Comments         Cervical Ripening:    Date     Time     Type         Delivery:    Episiotomy None   Local Anesthetic        Lacerations 1st   Sponge Count Correct       Needle Count Correct     Final Count by:    Sutures     Blood loss (ml) 100   Packing Intentionally Left In     Number     Comments           Information for the patient's :  Osmar Douglas [[id number]]       Delivery  2017 11:57 PM by     Sex:  female Gestational Age: 41w0d  Delivery Clinician:     Living?:            APGARS  One minute Five minutes Ten minutes   Skin color:            Heart rate:            Grimace:            Muscle tone:            Breathing:            Totals: 8  9         Presentation/position:           Resuscitation and Interventions: Method:  None  Oxygen Type:     Intubation Time:   # of Attempts:     ETT Size:        Tracheal Suction:     Tracheal returns:      [place] Care at Delivery:           Cord information:     Disposition of cord blood:      Blood gases sent?    Complications:     Placenta: Delivered:           appearance.  Comments:  .  Disposition: Hospital disposal  [place]  Measurements:  Weight:    Height:    Head circumference:    Chest circumference:     Temperature:     Other providers:       Additional  information:  Forceps:    Verbal Informed Consent Obtained:       Alternative Labor Strategies Discussed:     Emergency Resources Available:       Type:       Accrued Pulling Time:       # of Pulls:      Position:     Fetal Station:       Indications:      Other Indications:     Operative Vaginal Delivery Brief Note Forceps:        Vacuum:    Verbal Informed Consent Obtained:     Alternative Labor Strategies Discussed:     Emergency Resources Available:     Type:      Accrued Pulling Time:       # of Pop-Offs:       # of Pulls:       Position:     Fetal Station:      Indications for Vacuum:       Other Indications:    Operative Vaginal Delivery Brief Note Vacuum:        Shoulder Dystocia Shoulder Dystocia    No data filed           Breech:       : Type:     Indications for Primary:     Indications for Secondary:     Other Indications:        Observed anomalies     Output in Delivery Room:

## 2017-02-21 NOTE — PLAN OF CARE
Data: Thanh Douglas transferred to 430 via wheelchair at 0210. Baby transferred via parent's arms.  Action: Receiving unit notified of transfer: Yes. Patient and family notified of room change. Report given to ELENA Gates at 0220. Belongings sent to receiving unit. Accompanied by Registered Nurse. Oriented patient to surroundings. Call light within reach.   Response: Patient tolerated transfer and is stable.

## 2017-02-21 NOTE — PLAN OF CARE
Problem: Goal Outcome Summary  Goal: Goal Outcome Summary  Stable patient meeting expected goals. All VS stable. Using ice for comfort. Breastfeeding infant. Up independently, voiding without difficulty. Bonding well with .

## 2017-02-21 NOTE — H&P
"  2017    Ardo Stella  [id number]            OB Admit History & Physical      Ms. Douglas  is here with SROM.    She has noticed gush of fluid    Patient's LMP from OB Dating Form was 2016.   Her Estimated Date of Delivery: Data Unavailable  , making her 41w0d  wks.      Estimated body mass index is 28.25 kg/(m^2) as calculated from the following:    Height as of this encounter: 1.676 m (5' 6\").    Weight as of this encounter: 79.4 kg (175 lb).  Her prenatal course has been un complicated         Estimated fetal weight= 7 1/2 lb       She is a 26 year old   Her OB history:   Obstetric History       T1      TAB0   SAB0   E0   M0   L1       # Outcome Date GA Lbr Satnam/2nd Weight Sex Delivery Anes PTL Lv   1 Term 17 41w0d 02:55 / 02:17  F  EPI N Y      Name: STELLA,BABYRomina ARDO      Apgar1:  8                Apgar5: 9             History reviewed. No pertinent past medical history.     History reviewed. No pertinent past surgical history.      No current outpatient prescriptions on file.       Allergies: Review of patient's allergies indicates no known allergies.      REVIEW OF SYSTEMS:  NEUROLOGIC:  Negative  EYES:  Negative  ENT:  Negative  GI:  Negative  BREAST:  Negative  :  Negative  GYN:  Negative  CV:  Negative  PULMONARY:  Negative  MUSCULOSKELETAL:  Negative  PSYCH:  Negative        Social History     Social History     Marital status:      Spouse name: N/A     Number of children: N/A     Years of education: N/A     Occupational History     Not on file.     Social History Main Topics     Smoking status: Never Smoker     Smokeless tobacco: Never Used     Alcohol use No     Drug use: No     Sexual activity: No      Comment: 07 alta rankin     Other Topics Concern      Service No     Blood Transfusions No     Caffeine Concern No     Occupational Exposure No     Hobby Hazards No     Sleep Concern No     Stress Concern No     Weight Concern No     Special Diet No     " Back Care No     Exercise No     Bike Helmet No     Seat Belt Yes     Self-Exams No     Social History Narrative      Family History   Problem Relation Age of Onset     Unknown/Adopted Maternal Grandfather           Unknown/Adopted Paternal Grandfather              Vitals:     With contractions every  5 min    Alert Awake in NAD  HEENT grossly normal  Neck: no lymphadenopathy or thryoidomegaly  Lungs clear  Back no spinal or CVAT  Heart RR  ABD gravid, term on exam with vertex palpable  Pelvic:  clear fluid noted, no blood noted  Cervix is 3 cm / 90 % effaced at -1 station  EXT:  no edema or calf tenderness  Neuro:  intact    Assessment:  IUP at 41w0d  SROM    Plan:  Anticipate      Augment as necessary    [unfilled]      Mandeep Barahona MD  Dept of OB/GYN  2017

## 2017-02-21 NOTE — PROCEDURES
Delivery Summary    Thanh Douglas MRN# [id number]   Age: 26 year old YOB: 1990     ASSESSMENT & PLAN:        Labor Event Times    Labor onset date:  17 Onset time:   6:45 PM   Dilation complete date:  17 Complete time:   9:40 PM   Start pushing date/time:  2017 2251            Labor Length    1st Stage (hrs):  2 (min):  55   2nd Stage (hrs):  2 (min):  17   3rd Stage (hrs):  0 (min):  7      Labor Events     labor?:  No   Labor Type:  Spontaneous   Predominate monitoring during 1st stage:  continuous electronic fetal monitoring      Antibiotics received during labor?:  No      Rupture identifier:  Rupture 1   Rupture date/time: 17 0400   Rupture type:  Spontaneous rupture of membranes occuring during spontaneous labor or augmentation   Fluid color:  Clear   Fluid odor:  Normal      Delivery/Placenta Date and Time    Delivery Date:  17 Delivery Time:  11:57 PM   Placenta Date/Time:  2017 12:04 AM   Oxytocin given at the time of delivery:  after delivery of placenta      Vaginal Counts    Initial count performed by 2 team members:   Two Team Members   ELENA Bailey Dr.          [employer]   Initial counts 2  5    Added to count  1     Final counts 2 1 5       Placed during labor Accounted for at the end of labor   NA NA   NA NA   NA NA      Final count performed by 2 team members:   Two Team Members   ELENA Bailey Dr.         Final count correct?:  Yes         Apgars    Living status:  Yes    1 Minute 5 Minute 10 Minute 15 Minute 20 Minute   Skin color: 0  1       Heart rate: 2  2       Reflex irritability: 2  2       Muscle tone: 2  2       Respiratory effort: 2  2       Total: 8  9          Apgars assigned by:  SAMANTHA URRUTIA RN      Cord    Vessels:  3 Vessels Complications:  None   Cord Blood Disposition:  Lab           Resuscitation    Methods:  None         Skin to Skin and Feeding Plan    Skin to skin  initiation date/time: 2/21/17 6039   Skin to skin with:  Mother   Skin to skin end date/time:     How do you plan to feed your baby:  Breastfeeding      Delivery (Maternal) (Provider to Complete) (107691)    Episiotomy:  None   Perineal lacerations:  1st Repaired?:  Yes   Vaginal laceration?:  No    Cervical laceration?:  No    Vaginal delivery est. blood loss (mL):  100         Mother's Information  Mother: Thanh Douglas #[id number]    Start of Mother's Information     IO Blood Loss  02/20/17 1845 - 02/21/17 0016    Mom's I/O Activity            End of Mother's Information  Mother: Thanh Douglas #[id number]            Delivery - Provider to Complete (628576)    Attempted Delivery Types (Choose all that apply):  Spontaneous Vaginal Delivery                           Placenta    Delayed Cord Clamping:  Done   Date/Time:  2/21/2017 12:04 AM   Removal:  Spontaneous   Disposition:  Hospital disposal      Anesthesia    Method:  Epidural         Presentation and Position    Presentation:  Vertex    Occiput Anterior                    Mandeep Barahona MD

## 2017-02-21 NOTE — PROVIDER NOTIFICATION
02/20/17 2336   Provider Notification   Provider Name/Title Dr woods   Method of Notification At Bedside   Request Evaluate in Person     MD at bedside to check on pushing. Notified that contractions have spaced out and we are adjusting pitocin as appropriate. States he is right down to the deleon and to call when needed for delivery.

## 2017-02-21 NOTE — ANESTHESIA POSTPROCEDURE EVALUATION
Patient: Thanh Douglas    * No procedures listed *    Diagnosis:* No pre-op diagnosis entered *  Diagnosis Additional Information: No value filed.    Anesthesia Type:  No value filed.    Note:  Anesthesia Post Evaluation    Last vitals:  Vitals:    02/21/17 0135 02/21/17 0150 02/21/17 0525   BP: 120/70 131/79 109/83   Pulse:   98   Resp: 18 18 18   Temp:   98.3  F (36.8  C)   SpO2:            Electronically Signed By: Kevin Larios MD  February 21, 2017  7:13 AM    S/P epidural for labor.   I or my partner was immediately available for management of this patient during epidural analgesia infusion.  VSS.  Doing well. Block resolved.  Neuro at baseline. Denies positional headache. Minimal side effects easily managed w/ PRN meds. No apparent anesthetic complications. No follow-up required.    Kevin Larios MD

## 2017-02-21 NOTE — ANESTHESIA PREPROCEDURE EVALUATION
PAC NOTE:       ANESTHESIA PRE EVALUATION:  Anesthesia Evaluation       history and physical reviewed .        ROS/MED HX    ENT/Pulmonary:  - neg pulmonary ROS     Neurologic:  - neg neurologic ROS     Cardiovascular:  - neg cardiovascular ROS       METS/Exercise Tolerance:     Hematologic:         Musculoskeletal:         GI/Hepatic:  - neg GI/hepatic ROS       Renal/Genitourinary:         Endo:         Psychiatric:         Infectious Disease:         Malignancy:         Other:               Physical Exam      Airway     Dental     Cardiovascular       Pulmonary     Other findings:  at term, in labor, requesting pain  management    neg OB ROS            Anesthesia Plan      History & Physical Review      ASA Status:  .  OB Epidural Asa: 2       Plan for     Discussed risks of epidural catheter placement, including, but not limited to, bruising/bleeding, infection, pain, failure of epidural medications to relieve pain, dural puncture with subsequent headache/ need for epidural blood patch and nerve damage.  All questions answered, understanding voiced and she wishes to proceed.      Postoperative Care      Consents  Anesthetic plan, risks, benefits and alternatives discussed with:  Patient..                            .

## 2017-02-22 VITALS
OXYGEN SATURATION: 97 % | HEIGHT: 66 IN | RESPIRATION RATE: 18 BRPM | BODY MASS INDEX: 28.12 KG/M2 | TEMPERATURE: 98.6 F | WEIGHT: 175 LBS | DIASTOLIC BLOOD PRESSURE: 64 MMHG | HEART RATE: 82 BPM | SYSTOLIC BLOOD PRESSURE: 108 MMHG

## 2017-02-22 PROCEDURE — 25000132 ZZH RX MED GY IP 250 OP 250 PS 637: Performed by: OBSTETRICS & GYNECOLOGY

## 2017-02-22 RX ORDER — IBUPROFEN 400 MG/1
400-800 TABLET, FILM COATED ORAL EVERY 6 HOURS PRN
Qty: 120 TABLET | Refills: 0 | Status: SHIPPED | OUTPATIENT
Start: 2017-02-22 | End: 2018-03-14

## 2017-02-22 RX ADMIN — IBUPROFEN 800 MG: 400 TABLET ORAL at 04:08

## 2017-02-22 RX ADMIN — PRENATAL VIT W/ FE FUMARATE-FA TAB 27-0.8 MG 1 TABLET: 27-0.8 TAB at 08:42

## 2017-02-22 RX ADMIN — SENNOSIDES AND DOCUSATE SODIUM 1 TABLET: 8.6; 5 TABLET ORAL at 08:42

## 2017-02-22 RX ADMIN — IBUPROFEN 800 MG: 400 TABLET ORAL at 10:00

## 2017-02-22 NOTE — PLAN OF CARE
Problem: Goal Outcome Summary  Goal: Goal Outcome Summary  VSS, fundus firm, lochia scant, independent in self and baby care. Education complete, postpartum depression screenings/when to retake discussed and understood. Verbalized understanding of follow up. Discharge instructions discussed with pt and . Verbalized understanding of all discharge instructions. Denies questions at this time.

## 2017-02-22 NOTE — DISCHARGE INSTRUCTIONS

## 2017-02-22 NOTE — PLAN OF CARE
Problem: Goal Outcome Summary  Goal: Goal Outcome Summary  Outcome: Improving  Doing well with self and infant cares. Requesting formula for baby, encouraged to put baby to breast prior to offering any formula. Ibuprofen and tylenol for pain with stated relief. Denies difficulty voiding. FOB present and supportive.

## 2017-02-22 NOTE — PROGRESS NOTES
[facility] Obstetrics Post-Partum Progress Note          Assessment and Plan:    Assessment:   Post-partum day #2  Normal spontaneous vaginal delivery  L&D complications: None      Doing well.      Plan:   Lactation consultation  Discharge later today           Interval History:   Doing well.  Pain is well-controlled.  No fevers.  No history of foul-smelling vaginal discharge.  Good appetite.  Denies chest pain, shortness of breath, nausea or vomiting.  Vaginal bleeding is similar to a heavy menstrual flow.  Ambulatory.  Breastfeeding well.          Significant Problems:    None          Review of Systems:    The patient denies any chest pain, shortness of breath, excessive pain, fever, chills, purulent drainage from the wound, nausea or vomiting.          Medications:   All medications related to the patient's surgery have been reviewed          Physical Exam:     All vitals stable  Patient Vitals for the past 12 hrs:   BP Temp Temp src Pulse Resp   02/22/17 0840 108/64 98.6  F (37  C) Oral 82 18   02/22/17 0015 117/67 98  F (36.7  C) Oral 80 16     Uterine fundus is firm, non-tender and at the level of the umbilicus          Data:     All laboratory data related to this surgery reviewed  Hemoglobin   Date Value Ref Range Status   02/21/2017 10.3 (L) 11.7 - 15.7 g/dL Final   11/22/2016 10.4 (L) 11.7 - 15.7 g/dL Final   07/27/2016 10.8 (L) 11.7 - 15.7 g/dL Final   05/15/2008 12.3 11.7 - 15.7 g/dL Final   04/09/2007 13.2 11.7 - 15.7 g/dL Final     No imaging studies have been ordered    James Harris MD, MD

## 2017-02-22 NOTE — LACTATION NOTE
Lactation in to see patient. First baby. Basic breast feeding information given. Patient states baby latches and nurses well. Encouraged to call prn

## 2017-02-22 NOTE — LACTATION NOTE
LC to see patient.  She has some nipple discomfort and redness.  She was given cream and LC assisted with better positioning.  Her milk is coming in and baby was gulping during feeds.  She is aware she may call lactation prn.

## 2017-02-22 NOTE — PLAN OF CARE
Problem: Goal Outcome Summary  Goal: Goal Outcome Summary  Outcome: Improving    Patient meeting expected outcomes. Ibuprofen managing pain. Breastfeeding and supplementing with formula. Independent with self and  cares. Bonding with baby.

## 2017-04-04 ENCOUNTER — PRENATAL OFFICE VISIT (OUTPATIENT)
Dept: OBGYN | Facility: CLINIC | Age: 27
End: 2017-04-04
Payer: COMMERCIAL

## 2017-04-04 VITALS
SYSTOLIC BLOOD PRESSURE: 110 MMHG | WEIGHT: 156.6 LBS | HEART RATE: 78 BPM | OXYGEN SATURATION: 98 % | BODY MASS INDEX: 25.28 KG/M2 | DIASTOLIC BLOOD PRESSURE: 78 MMHG

## 2017-04-04 DIAGNOSIS — N89.8 VAGINAL ITCHING: ICD-10-CM

## 2017-04-04 LAB
MICRO REPORT STATUS: NORMAL
SPECIMEN SOURCE: NORMAL
WET PREP SPEC: NORMAL

## 2017-04-04 PROCEDURE — 87210 SMEAR WET MOUNT SALINE/INK: CPT | Performed by: OBSTETRICS & GYNECOLOGY

## 2017-04-04 PROCEDURE — 99207 ZZC POST PARTUM EXAM: CPT | Performed by: OBSTETRICS & GYNECOLOGY

## 2017-04-04 NOTE — NURSING NOTE
"Chief Complaint   Patient presents with     Postpartum Care       Initial /78  Pulse 78  Wt 156 lb 9.6 oz (71 kg)  LMP 2016 (Exact Date)  SpO2 98%  BMI 25.28 kg/m2 Estimated body mass index is 25.28 kg/(m^2) as calculated from the following:    Height as of 17: 5' 6\" (1.676 m).    Weight as of this encounter: 156 lb 9.6 oz (71 kg).  BP completed using cuff size: regular        The following HM Due: NONE      The following patient reported/Care Every where data was sent to:  P ABSTRACT QUALITY INITIATIVES [12518]     Pt complains vaginal itching  No odor or discharge       Rani Hubbard CMA                "

## 2017-04-04 NOTE — PROGRESS NOTES
SUBJECTIVE: Thanh is here for a 6-week postpartum checkup.    Date of Last Pap:  8/10/2016    Delivery date was 2017. She had a  and induced vaginal delivery of a viable girl, weight 6 pounds 5.9 oz., with none complications.  Since delivery, she has been breast feeding.  She has no signs of infection, bleeding or other complications.  We discussed contraceptions and she is unsure at this time.  She  has not had intercourse since delivery and complains of no discomfort. Patient screened for postpartum depression and complaints are NEGATIVE. Screening has also been completed for intimate partner violence.      She does not plan to resume intercourse yet, will discuss options with her partner.   Also complains of mild vulvar itching, scant discharge, no odor.    EXAM:  Gen: sitting in chair in no acute distress, comfortable  HENT: no scleral icterus, thyroid normal size  CV: regular rate, well perfused  Pulm: no increased work of breathing, no cough  Skin: warm and dry, no rashes/lesions  Psych: mood stable, appropriate affect  Neuro: A+Ox3   : External genitalia normal well-estrogenized, healthy tissue, hair completely shaved with mild erythema and dry skin around vulva.  No obvious excoriations, lesions, or rashes. Bartholins, urethra, normal.  Normal moist pink vaginal mucosa.  SSE: Normal cervix, normal physiologic discharge.     Results for orders placed or performed in visit on 17   Wet prep   Result Value Ref Range    Specimen Description Vagina     Wet Prep       No Trichomonas seen  No yeast seen  No clue cells seen      Micro Report Status FINAL 2017       ASSESSMENT:   Normal postpartum exam after .    PLAN:  1) wet prep today for vulvar pruritis, negative. Discussed vulvar hygiene. She does not feel the itching is c/w yeast infection.   2) Contraception: discussed LARCs, depo, pills, and condoms. She is undecided at this time. Strongly encouraged contraceptive use prior to  resuming intercourse. She will consider and contact the clinic.   3) Return as needed or at time of next expected pap, pelvic, or breast exam.    Laurel Diggs MD

## 2017-04-04 NOTE — LETTER
[address] [address]  [phone]      Dear Thanh,        The results of your recent labs were NORMAL.  If you have any further questions or problems, please contact our office.    Sincerely,      OANH Rose MD/erika

## 2017-04-04 NOTE — MR AVS SNAPSHOT
"              After Visit Summary   2017    Thanh Douglas    MRN: [id number]           Patient Information     Date Of Birth          1990        Visit Information        Provider Department      2017 1:45 PM Laurel Diggs MD; [employer] [facility]        Today's Diagnoses     Routine postpartum follow-up    -  1    Vaginal itching           Follow-ups after your visit        Who to contact     If you have questions or need follow up information about today's clinic visit or your schedule please contact [facility] directly at [phone].  Normal or non-critical lab and imaging results will be communicated to you by [employer], letter or phone within 4 business days after the clinic has received the results. If you do not hear from us within 7 days, please contact the clinic through [employer] or phone. If you have a critical or abnormal lab result, we will notify you by phone as soon as possible.  Submit refill requests through [employer] or call your pharmacy and they will forward the refill request to us. Please allow 3 business days for your refill to be completed.          Additional Information About Your Visit        MyChart Information     [employer] lets you send messages to your doctor, view your test results, renew your prescriptions, schedule appointments and more. To sign up, go to [email or web address] . Click on \"Log in\" on the left side of the screen, which will take you to the Welcome page. Then click on \"Sign up Now\" on the right side of the page.     You will be asked to enter the access code listed below, as well as some personal information. Please follow the directions to create your username and password.     Your access code is: [email or web address]  Expires: 2017  5:59 PM     Your access code will  in 90 days. If you need help or a new code, please call your [facility] or [phone].        Care EveryWhere ID  "    This is your Care EveryWhere ID. This could be used by other organizations to access your [facility] medical records  [id number]        Your Vitals Were     Pulse Last Period Pulse Oximetry BMI (Body Mass Index)          78 05/16/2016 (Exact Date) 98% 25.28 kg/m2         Blood Pressure from Last 3 Encounters:   04/04/17 110/78   02/22/17 108/64   02/13/17 120/70    Weight from Last 3 Encounters:   04/04/17 156 lb 9.6 oz (71 kg)   02/20/17 175 lb (79.4 kg)   02/13/17 175 lb 3.2 oz (79.5 kg)              We Performed the Following     OB HEMOGLOBIN     Wet prep        Primary Care Provider Office Phone # Fax #    Sharon Rose [phone]       [facility] [address] [facility] [address]        Thank you!     Thank you for choosing [facility]  for your care. Our goal is always to provide you with excellent care. Hearing back from our patients is one way we can continue to improve our services. Please take a few minutes to complete the written survey that you may receive in the mail after your visit with us. Thank you!             Your Updated Medication List - Protect others around you: Learn how to safely use, store and throw away your medicines at www.disposemymeds.org.          This list is accurate as of: 4/4/17  4:19 PM.  Always use your most recent med list.                   Brand Name Dispense Instructions for use    ibuprofen 400 MG tablet    ADVIL/MOTRIN    120 tablet    Take 1-2 tablets (400-800 mg) by mouth every 6 hours as needed for other (cramping)       prenatal multivitamin  plus iron 27-0.8 MG Tabs per tablet     100 tablet    Take 1 tablet by mouth daily

## 2017-04-05 ASSESSMENT — PATIENT HEALTH QUESTIONNAIRE - PHQ9: SUM OF ALL RESPONSES TO PHQ QUESTIONS 1-9: 4

## 2017-04-05 NOTE — PROGRESS NOTES
Results discussed directly with patient in clinic. Further details documented in the note.     Laurel Diggs MD

## 2017-06-26 ENCOUNTER — OFFICE VISIT (OUTPATIENT)
Dept: OBGYN | Facility: CLINIC | Age: 27
End: 2017-06-26
Payer: COMMERCIAL

## 2017-06-26 VITALS
DIASTOLIC BLOOD PRESSURE: 64 MMHG | HEART RATE: 68 BPM | OXYGEN SATURATION: 98 % | WEIGHT: 162 LBS | SYSTOLIC BLOOD PRESSURE: 108 MMHG | BODY MASS INDEX: 26.15 KG/M2

## 2017-06-26 DIAGNOSIS — N89.8 VAGINAL DISCHARGE: Primary | ICD-10-CM

## 2017-06-26 DIAGNOSIS — B37.31 YEAST INFECTION OF THE VAGINA: ICD-10-CM

## 2017-06-26 LAB
MICRO REPORT STATUS: ABNORMAL
SPECIMEN SOURCE: ABNORMAL
WET PREP SPEC: ABNORMAL

## 2017-06-26 PROCEDURE — 99213 OFFICE O/P EST LOW 20 MIN: CPT | Performed by: ADVANCED PRACTICE MIDWIFE

## 2017-06-26 PROCEDURE — 87210 SMEAR WET MOUNT SALINE/INK: CPT | Performed by: ADVANCED PRACTICE MIDWIFE

## 2017-06-26 RX ORDER — FLUCONAZOLE 150 MG/1
150 TABLET ORAL ONCE
Qty: 1 TABLET | Refills: 0 | Status: SHIPPED | OUTPATIENT
Start: 2017-06-26 | End: 2017-06-26

## 2017-06-26 NOTE — MR AVS SNAPSHOT
After Visit Summary   6/26/2017    Thanh Douglas    MRN: [id number]           Patient Information     Date Of Birth          1990        Visit Information        Provider Department      6/26/2017 4:15 PM Nohemy Atkins CNM; [employer] [facility]        Today's Diagnoses     Vaginal discharge    -  1      Care Instructions    Vaginitis (Vaginal Irritation/Infection)    Vaginitis is very common!  The most common vaginal infections are bacterial vaginosis or yeast. These infections are not sexually transmitted but can be incredibly uncomfortable. Seek care from your midwife if signs or symptoms arise.     Normal vaginal discharge:      Is white, clear, thick or thin (it may change depending on where you are in your cycle)    Does not have a foul odor    The amount of discharge varies    Abnormal discharge/symptoms:       Itching in and around the vagina    Redness, pain or swelling    Discharge that is foamy, greenish, curd like, or bloody    Foul smelling odor    Pain when urinating or having sex    Fever    Causes of vaginal infections:      Good bacteria from the vagina have been destroyed by bad bacteria    Reaction to something in the vagina such as a tampon or scented/perfumed soaps or bubble bath    STI's    Sensitivities to soaps/detergents/dryer sheets, lubricants, etc.    Hormonal changes    Recent use of antibiotics     Infections can also occur after you've had intercourse with a new partner or if you have had frequent intercourse         Here is a list of suggestions that may help prevent/treat vaginal infections and will help maintain a healthy vaginal environment:      1.  Boosting your immune system so you can heal faster      Make sure you are getting adequate sleep    Drink 2-3 quarts of fluids per day, Cranberries or cranberry juice (unsweetened)    Eat more nuts, grains, raw veggies, yogurt, brie, grapefruit    Decrease intake of  refined sugar, red meat and alcohol    Echinacea - 3 times a day for chronic problem or every 2 hours for acute symptoms; use as directed on bottle          2.  Changing the vaginal environment to a more acid state       Soak in a warm bath tub with one cup of vinegar or lemon juice. Do not use scented soap, bubble bath, or oils.     Acidophilus capsules:  1 in your vagina at bedtime for 5-7 nights    Herbal sitz bath or jeanie-wash with:  TBSP tea tree oil or 2 TBS cider vinegar      3.  Increasing the good healthy bacteria      At each meal drink 1 tsp apple cider vinegar and 1 tsp honey in   cup warm water    Eat garlic daily, capsules or fresh.      Take probiotics 4-8 billion units/day      4.  Preventive measures      Wear cotton underwear, no thongs.  Do not wear tight clothes or pantyhose    Shower soon after working or change out of sweaty clothing     Do not wear underwear to bed.  The vaginal environment needs to breathe    Never douche or use vaginal , the vagina is self-cleaning!    Use white, unscented toilet paper.  Do not use baby wipes.  Wipe from front to back    Use only unscented tampons and pads, buy organic products if desired    Do not use perfumes/oils/lotions near your vagina or take bubble baths    Use only mild, unscented soaps around your vaginal area     Do not use fabric softeners/dryer sheets    Use gentle, unscented detergent, consider buying non-petroleum based detergents    Use only water based lubricants during sexual contact    Abstain from intercourse during times of infection    Alternative Treatment  Boric acid capsules one per vagina (not by mouth!!! Very toxic if taken orally) at bedtime for 5 days (or as suggested by your provider) may be an effective alternative treatment and also more effective for those with chronic yeast vaginitis. Boric acid is available at the pharmacy but must be purchased along with gelatin caps for insertion. It might also be available at a  "local compounding pharmacy. Boric acid is not safe for pregnant women. Discuss with your midwife if this treatment interests you.     If your symptoms do not resolve or if you have questions please call:     [facility]  [phone]                  Follow-ups after your visit        Who to contact     If you have questions or need follow up information about today's clinic visit or your schedule please contact [facility] directly at [phone].  Normal or non-critical lab and imaging results will be communicated to you by MyChart, letter or phone within 4 business days after the clinic has received the results. If you do not hear from us within 7 days, please contact the clinic through MyChart or phone. If you have a critical or abnormal lab result, we will notify you by phone as soon as possible.  Submit refill requests through [employer] or call your pharmacy and they will forward the refill request to us. Please allow 3 business days for your refill to be completed.          Additional Information About Your Visit        [facility] Information     [employer] lets you send messages to your doctor, view your test results, renew your prescriptions, schedule appointments and more. To sign up, go to [email or web address] . Click on \"Log in\" on the left side of the screen, which will take you to the Welcome page. Then click on \"Sign up Now\" on the right side of the page.     You will be asked to enter the access code listed below, as well as some personal information. Please follow the directions to create your username and password.     Your access code is: [email or web address]  Expires: 2017  4:59 PM     Your access code will  in 90 days. If you need help or a new code, please call your [facility] clinic or [phone].        Care EveryWhere ID     This is your Care EveryWhere ID. This could be used by other organizations to access your [facility] medical records  [id number]      "   Your Vitals Were     Pulse Last Period Pulse Oximetry BMI (Body Mass Index)          68 06/17/2017 (Exact Date) 98% 26.15 kg/m2         Blood Pressure from Last 3 Encounters:   06/26/17 108/64   04/04/17 110/78   02/22/17 108/64    Weight from Last 3 Encounters:   06/26/17 162 lb (73.5 kg)   04/04/17 156 lb 9.6 oz (71 kg)   02/20/17 175 lb (79.4 kg)              We Performed the Following     Wet prep        Primary Care Provider Office Phone # Fax #    Sharon Rose [phone]       [place] [address] [facility] [address]        Equal Access to Services     TAB ORDONEZ : Hadii antonietta vineso Sogertrude, waaxda luqadaha, qaybta kaalmada adeegyada, mony yo . So [facility] [phone].    ATENCIÓN: Si habla español, tiene a awad disposición servicios gratuitos de asistencia lingüística. [facility] [phone].    We comply with applicable federal civil rights laws and [place] laws. We do not discriminate on the basis of race, color, national origin, age, disability sex, sexual orientation or gender identity.            Thank you!     Thank you for choosing [facility]  for your care. Our goal is always to provide you with excellent care. Hearing back from our patients is one way we can continue to improve our services. Please take a few minutes to complete the written survey that you may receive in the mail after your visit with us. Thank you!             Your Updated Medication List - Protect others around you: Learn how to safely use, store and throw away your medicines at www.disposemymeds.org.          This list is accurate as of: 6/26/17  4:59 PM.  Always use your most recent med list.                   Brand Name Dispense Instructions for use Diagnosis    ibuprofen 400 MG tablet    ADVIL/MOTRIN    120 tablet    Take 1-2 tablets (400-800 mg) by mouth every 6 hours as needed for other (cramping)    Vaginal delivery

## 2017-06-26 NOTE — PROGRESS NOTES
SUBJECTIVE:   Thanh is a 26 year old here for vaginal symptoms:  White discharge and vaginal itching x one week                  Vaginal Symptoms     Onset: 7 days ago    Description:  Vaginal Discharge: white  Itching (Pruritis): Yes  Burning sensation:  Yes  Odor:  No  Irritation:  Yes    Accompanying Signs & Symptoms:  Pain with Urination: No  Abdominal Pain:  No  Fever: No   History:   Sexually active:  Yes  New Partner:  No  Possibility of Pregnancy:  Don't Know  Contraceptive type: none    Precipitating factors:   Recent Antibiotic Use: No    Alleviating factors:  none   Therapies Tried and outcome:   Previous Episodes of Vaginitis:  Yes: multiple infections during recent pregnancy. Treated with both pills and creams. Symptoms resolve but return after a few weeks      Other associated symptoms: none.  History of STI's:  No    LMP: Patient's last menstrual period was 06/17/2017 (exact date).      Patient Active Problem List   Diagnosis     Abdominal pain     Indication for care in labor or delivery     Labor and delivery indication for care or intervention     Vaginal delivery     History reviewed. No pertinent past medical history.  History reviewed. No pertinent surgical history.  Current Outpatient Prescriptions   Medication Sig Dispense Refill     fluconazole (DIFLUCAN) 150 MG tablet Take 1 tablet (150 mg) by mouth once for 1 dose 1 tablet 0     ibuprofen (ADVIL/MOTRIN) 400 MG tablet Take 1-2 tablets (400-800 mg) by mouth every 6 hours as needed for other (cramping) 120 tablet 0     No Known Allergies    Health maintenance updated:  yes    ROS:   C: NEGATIVE for fever, chills, change in weight  GI: NEGATIVE for nausea, abdominal pain, heartburn, or change in bowel habits  : NEGATIVE for unusual urinary symptoms. Periods are regular. White vaginal discharge with itching x one week  M: NEGATIVE for significant arthralgias or myalgia  N: NEGATIVE for weakness, dizziness or paresthesias  H: NEGATIVE for  bleeding problems  P: NEGATIVE for changes in mood or affect    PHYSICAL EXAM:    /64  Pulse 68  Wt 162 lb (73.5 kg)  LMP 06/17/2017 (Exact Date)  SpO2 98%  BMI 26.15 kg/m2, Body mass index is 26.15 kg/(m^2).  General appearance:  healthy, alert and no distress  Pelvic Exam:  Exam:  Constitutional: healthy, alert and no distress  Psychiatric: mentation appears normal and affect normal/bright    Patient did self swab for wet prep    ASSESSMENT/PLAN:     ICD-10-CM    1. Vaginal discharge N89.8 Wet prep   2. Yeast infection of the vagina B37.3 fluconazole (DIFLUCAN) 150 MG tablet       Results for orders placed or performed in visit on 06/26/17   Wet prep   Result Value Ref Range    Specimen Description Vagina     Wet Prep (A)      No clue cells seen  No Trichomonas seen  Yeast seen      Micro Report Status FINAL 06/26/2017      Discussed options for vaginal yeast infection. Patient prefers to use oral medication. Advised patient that if she gets another infection, we may investigate further to see why her yeast keeps coming back.     COUNSELING:  Use a probiotic when taking antibiotics  Abstain from sexual intercourse while being treated for vaginal infection  Handout provided about vaginitis and how to prevent future infections.  Return to clinic if symptoms persist or worsen    DANTE Becerril, TRINI

## 2017-06-26 NOTE — NURSING NOTE
"Chief Complaint   Patient presents with     Vaginal Problem       Initial /64  Pulse 68  Wt 162 lb (73.5 kg)  LMP 2017 (Exact Date)  SpO2 98%  BMI 26.15 kg/m2 Estimated body mass index is 26.15 kg/(m^2) as calculated from the following:    Height as of 17: 5' 6\" (1.676 m).    Weight as of this encounter: 162 lb (73.5 kg).  BP completed using cuff size: regular        The following HM Due: NONE      The following patient reported/Care Every where data was sent to:  P ABSTRACT QUALITY INITIATIVES [45847]            1 week     discharge  Rani Hubbard CMA     "

## 2018-03-14 ENCOUNTER — PRENATAL OFFICE VISIT (OUTPATIENT)
Dept: NURSING | Facility: CLINIC | Age: 28
End: 2018-03-14
Payer: COMMERCIAL

## 2018-03-14 VITALS — DIASTOLIC BLOOD PRESSURE: 62 MMHG | SYSTOLIC BLOOD PRESSURE: 96 MMHG

## 2018-03-14 DIAGNOSIS — Z34.90 SUPERVISION OF NORMAL PREGNANCY: Primary | ICD-10-CM

## 2018-03-14 LAB
ABO + RH BLD: NORMAL
ABO + RH BLD: NORMAL
BETA HCG QUAL IFA URINE: POSITIVE
BLD GP AB SCN SERPL QL: NORMAL
BLOOD BANK CMNT PATIENT-IMP: NORMAL
ERYTHROCYTE [DISTWIDTH] IN BLOOD BY AUTOMATED COUNT: 14.4 % (ref 10–15)
HCT VFR BLD AUTO: 37 % (ref 35–47)
HGB BLD-MCNC: 11.9 G/DL (ref 11.7–15.7)
MCH RBC QN AUTO: 26.3 PG (ref 26.5–33)
MCHC RBC AUTO-ENTMCNC: 32.2 G/DL (ref 31.5–36.5)
MCV RBC AUTO: 82 FL (ref 78–100)
PLATELET # BLD AUTO: 344 10E9/L (ref 150–450)
RBC # BLD AUTO: 4.52 10E12/L (ref 3.8–5.2)
SPECIMEN EXP DATE BLD: NORMAL
WBC # BLD AUTO: 9.4 10E9/L (ref 4–11)

## 2018-03-14 PROCEDURE — 87086 URINE CULTURE/COLONY COUNT: CPT | Performed by: OBSTETRICS & GYNECOLOGY

## 2018-03-14 PROCEDURE — 99207 ZZC NO CHARGE NURSE ONLY: CPT

## 2018-03-14 PROCEDURE — 36415 COLL VENOUS BLD VENIPUNCTURE: CPT | Performed by: OBSTETRICS & GYNECOLOGY

## 2018-03-14 PROCEDURE — 85027 COMPLETE CBC AUTOMATED: CPT | Performed by: OBSTETRICS & GYNECOLOGY

## 2018-03-14 PROCEDURE — 87389 HIV-1 AG W/HIV-1&-2 AB AG IA: CPT | Performed by: OBSTETRICS & GYNECOLOGY

## 2018-03-14 PROCEDURE — 86900 BLOOD TYPING SEROLOGIC ABO: CPT | Performed by: OBSTETRICS & GYNECOLOGY

## 2018-03-14 PROCEDURE — 84703 CHORIONIC GONADOTROPIN ASSAY: CPT | Performed by: OBSTETRICS & GYNECOLOGY

## 2018-03-14 PROCEDURE — 86780 TREPONEMA PALLIDUM: CPT | Performed by: OBSTETRICS & GYNECOLOGY

## 2018-03-14 PROCEDURE — 86850 RBC ANTIBODY SCREEN: CPT | Performed by: OBSTETRICS & GYNECOLOGY

## 2018-03-14 PROCEDURE — 86901 BLOOD TYPING SEROLOGIC RH(D): CPT | Performed by: OBSTETRICS & GYNECOLOGY

## 2018-03-14 PROCEDURE — 87340 HEPATITIS B SURFACE AG IA: CPT | Performed by: OBSTETRICS & GYNECOLOGY

## 2018-03-14 PROCEDURE — 86762 RUBELLA ANTIBODY: CPT | Performed by: OBSTETRICS & GYNECOLOGY

## 2018-03-14 RX ORDER — PRENATAL VIT/IRON FUM/FOLIC AC 27MG-0.8MG
1 TABLET ORAL DAILY
COMMUNITY
End: 2018-03-14

## 2018-03-14 RX ORDER — PRENATAL VIT/IRON FUM/FOLIC AC 27MG-0.8MG
1 TABLET ORAL DAILY
Qty: 100 TABLET | Refills: 3 | Status: SHIPPED | OUTPATIENT
Start: 2018-03-14 | End: 2018-05-14

## 2018-03-14 NOTE — LETTER
March 9, 2018      Thanh Douglas  [address]  [place] [address]      Dear Thanh Douglas    We welcome you to our clinic and appreciate that you have selected us to be your health care provider.   Our goal is to provide you with exceptional care and customer service.  Whether you are visiting our facility for care, calling us, or using our [employer] messaging system, we want to respectfully meet your needs.     To prepare for your upcoming visit please find enclosed medical history forms and registration forms  that need to be completed for your prenatal visit with the nurse on March 14 at 2:30 P.M.    Please bring these completed forms with you.    We ask that you arrive 15 minutes prior to your appointment time to complete the registration process.   You can check-in on the first floor in [address].      Before your first visit with the doctor or midwife, you are required to have prenatal lab work done.  You will do this right after your visit with the nurse.  If you do not complete your visit with a nurse, your doctor/midwife appointment will need to be rescheduled.      Children are not allowed to be in the class (if you have a one on one visit, you can bring your children if necessary, but we prefer that you do not).      Sincerely,      Your OB/GYN Health Team at [facility][1]

## 2018-03-14 NOTE — MR AVS SNAPSHOT
After Visit Summary   3/14/2018    Thanh Douglas    MRN: [id number]           Patient Information     Date Of Birth          1990        Visit Information        Provider Department      3/14/2018 2:15 PM [employer];  PRENATAL NURSE [facility]        Today's Diagnoses     Supervision of normal pregnancy    -  1       Follow-ups after your visit        Your next 10 appointments already scheduled     Mar 21, 2018  2:00 PM CDT   US OB < 14 WEEKS SINGLE with OXUS1   [facility] ([facility])    [address] [place] [id number] [phone]           Please bring a list of your medicines (including vitamins, minerals and over-the-counter drugs). Also, tell your doctor about any allergies you may have. Wear comfortable clothes and leave your valuables at home.  If you re less than 20 weeks drink four 8-ounce glasses of fluid an hour before your exam. If you need to empty your bladder before your exam, try to release only a little urine. Then, drink another glass of fluid.  You may have up to two family members in the exam room. If you bring a small child, an adult must be there to care for him or her.  Please call the Imaging Department at your exam site with any questions.            Apr 04, 2018  3:30 PM CDT   New Prenatal with Laurel Diggs MD   [facility] ([facility]    [address] [id number] [phone]              Future tests that were ordered for you today     Open Future Orders        Priority Expected Expires Ordered    US OB < 14 weeks, single,  for dating (GFY710) Routine  6/12/2018 3/14/2018            Who to contact     If you have questions or need follow up information about today's clinic visit or your schedule please contact [facility] directly at [phone].  Normal or  "non-critical lab and imaging results will be communicated to you by MyChart, letter or phone within 4 business days after the clinic has received the results. If you do not hear from us within 7 days, please contact the clinic through happnt or phone. If you have a critical or abnormal lab result, we will notify you by phone as soon as possible.  Submit refill requests through [employer] or call your pharmacy and they will forward the refill request to us. Please allow 3 business days for your refill to be completed.          Additional Information About Your Visit        [employer] Information     [employer] lets you send messages to your doctor, view your test results, renew your prescriptions, schedule appointments and more. To sign up, go to [email or web address] . Click on \"Log in\" on the left side of the screen, which will take you to the Welcome page. Then click on \"Sign up Now\" on the right side of the page.     You will be asked to enter the access code listed below, as well as some personal information. Please follow the directions to create your username and password.     Your access code is: [email or web address]  Expires: 2018  3:20 PM     Your access code will  in 90 days. If you need help or a new code, please call your [place] clinic or [phone].        Care EveryWhere ID     This is your Care EveryWhere ID. This could be used by other organizations to access your [place] medical records  [id number]        Your Vitals Were     Last Period                   2017            Blood Pressure from Last 3 Encounters:   18 96/62   17 108/64   17 110/78    Weight from Last 3 Encounters:   17 162 lb (73.5 kg)   17 156 lb 9.6 oz (71 kg)   17 175 lb (79.4 kg)              We Performed the Following     ABO/RH Type and Screen     Anti Treponema     Beta HCG qual IFA urine     CBC with Platelets     Hepatitis B surface antigen     HIV Antigen Antibody Combo     Rubella " Antibody IgG Quantitative     Urine Culture Aerobic Bacterial          Where to get your medicines      These medications were sent to [place], [place] - [address].  [address] [place] [address] [facility] [address]     Phone:  [phone]     prenatal multivitamin plus iron 27-0.8 MG Tabs per tablet          Primary Care Provider Office Phone # Fax #    [facility] [address] [facility] [phone] [address] [facility] [address]        Equal Access to Services     [facility] : Hadii aad ku hadasho Soomaali, waaxda luqadaha, qaybta kaalmada adeegyada, waxay idiin hayaan adeeg kharacandie yo . So [facility] [phone].    ATENCIÓN: Si habla español, tiene a awad disposición servicios gratuitos de asistencia lingüística. Llame al [phone].    We comply with applicable federal civil rights laws and [place] laws. We do not discriminate on the basis of race, color, national origin, age, disability, sex, sexual orientation, or gender identity.            Thank you!     Thank you for choosing [facility]  for your care. Our goal is always to provide you with excellent care. Hearing back from our patients is one way we can continue to improve our services. Please take a few minutes to complete the written survey that you may receive in the mail after your visit with us. Thank you!             Your Updated Medication List - Protect others around you: Learn how to safely use, store and throw away your medicines at www.disposemymeds.org.          This list is accurate as of 3/14/18  3:20 PM.  Always use your most recent med list.                   Brand Name Dispense Instructions for use Diagnosis    prenatal multivitamin plus iron 27-0.8 MG Tabs per tablet     100 tablet    Take 1 tablet by mouth daily    Supervision of normal pregnancy

## 2018-03-14 NOTE — PROGRESS NOTES
Pt attended one-on-one NPN nurse education.  Pt is , about 13w based off of LMP 17.  No complaints or concerns.    Lab Results   Component Value Date    PAP NIL 08/10/2016     No hx of abnml pap.    Nga MURRAY R.N.  [facility]

## 2018-03-15 LAB
BACTERIA SPEC CULT: NORMAL
HBV SURFACE AG SERPL QL IA: NONREACTIVE
HIV 1+2 AB+HIV1 P24 AG SERPL QL IA: NONREACTIVE
RUBV IGG SERPL IA-ACNC: 19 IU/ML
SPECIMEN SOURCE: NORMAL
T PALLIDUM IGG+IGM SER QL: NEGATIVE

## 2018-03-21 ENCOUNTER — RADIANT APPOINTMENT (OUTPATIENT)
Dept: ULTRASOUND IMAGING | Facility: CLINIC | Age: 28
End: 2018-03-21
Attending: OBSTETRICS & GYNECOLOGY
Payer: COMMERCIAL

## 2018-03-21 DIAGNOSIS — Z34.90 SUPERVISION OF NORMAL PREGNANCY: ICD-10-CM

## 2018-03-21 PROCEDURE — 76815 OB US LIMITED FETUS(S): CPT | Performed by: OBSTETRICS & GYNECOLOGY

## 2018-04-01 NOTE — PROGRESS NOTES
Please call patient and notify her of the dating discrepancy and update her with a new due date of 9/9/18. Please update dating in chart.     Thanks,  Laurel Diggs MD

## 2018-04-04 ENCOUNTER — PRENATAL OFFICE VISIT (OUTPATIENT)
Dept: OBGYN | Facility: CLINIC | Age: 28
End: 2018-04-04
Payer: COMMERCIAL

## 2018-04-04 VITALS
HEART RATE: 84 BPM | BODY MASS INDEX: 25.49 KG/M2 | WEIGHT: 158.6 LBS | HEIGHT: 66 IN | DIASTOLIC BLOOD PRESSURE: 62 MMHG | SYSTOLIC BLOOD PRESSURE: 106 MMHG

## 2018-04-04 DIAGNOSIS — Z34.80 SUPERVISION OF OTHER NORMAL PREGNANCY, ANTEPARTUM: Primary | ICD-10-CM

## 2018-04-04 PROCEDURE — 99207 ZZC FIRST OB VISIT: CPT | Performed by: OBSTETRICS & GYNECOLOGY

## 2018-04-04 PROCEDURE — 87491 CHLMYD TRACH DNA AMP PROBE: CPT | Performed by: OBSTETRICS & GYNECOLOGY

## 2018-04-04 PROCEDURE — 87591 N.GONORRHOEAE DNA AMP PROB: CPT | Performed by: OBSTETRICS & GYNECOLOGY

## 2018-04-04 NOTE — MR AVS SNAPSHOT
After Visit Summary   4/4/2018    Thanh Douglas    MRN: [id number]           Patient Information     Date Of Birth          1990        Visit Information        Provider Department      4/4/2018 3:15 PM Laurel Diggs MD; [employer] [facility] Instructions    Early Pregnancy Information:    Rest  Your body requires more sleep in early pregnancy. Try to get plenty of sleep at night or take a short nap during the day. Being tired can often trigger nausea. If your nausea is worse in the evening, try taking a nap before dinner.    Exercise  Energy levels are normally low in early pregnancy and exercise may be the last thing on your mind, but getting out and walking briskly for 30 minutes each day will increase metabolism, relieve stress and psychologically improve your outlook. Walking after meals can improve heartburn, constipation, and indigestion.   - You can generally continue the same exercise routine in early pregnancy that you were doing prior to pregnancy. If you were not getting daily exercise before, now is a great time to start by walking or biking for 30 minutes daily.  - Any exercise that causes cramping, bleeding, or pain needs to be stopped right away. Please report to your doctor.    [place]  It is safe to continue sexual activity in pregnancy. If you experience bleeding or pain with intercourse, please abstain until you are able to discuss this with your doctor.       Nausea & Vomiting  Women experience this problem in varying degrees during pregnancy and you may have different experiences in subsequent pregnancies. Some women experience  morning sickness,  but it is also common to experience nausea any time throughout the day.  Listen to your body and eat the kinds of foods that make you feel best.  Suggestions for Diet  The most important rule is to eat small amounts often - even if you are not hungry. Try not to go more than  three hours without eating during the day or ten hours at night. An empty stomach triggers nausea.   Eat slowly and avoid foods that are spicy or high in fat. These are difficult to digest. Do not overfill your stomach.   Drink fruit juices, water and milk between meals.   Eat a few crackers, dry toast or vanilla wafers before getting out of bed in the morning. Stay in bed 15-20 minutes after eating.  Give yourself extra time in the morning.   Do not brush your teeth until you have been up for a while.   Do not skip breakfast.   Have a snack at bedtime that includes both carbohydrates and protein, e.g., peanut butter toast or hot chocolate made with milk.  A specific food or drink may trigger nausea in one woman and alleviate it in another. Find out what works best for you and eliminate the foods that cause nausea.   Most women tolerate ice cold drinks and foods best. Sherbet and fruit juices are good examples.   Try avoid coffee and products containing caffeine; it increases stomach acid. About 1 cup of coffee daily is considered safe in pregnancy, but this may be triggering your nausea.  Avoid smoking: it also increases stomach acid.    Vitamins  Vitamins B6 and Vitamin C may help improve nausea. There have been no definite studies to prove this effective, but some women do improve.   To prevent nausea take: 25 mg of Vitamin B6 daily. You can take this up to 3 times daily. You may have to cut a tablet in half to get to 25mg   Take: 500 mg. Vitamin C daily.   Yogurt is a good source of the B Vitamins.   If taking your prenatal vitamin increases or causes nausea, stop for 7-10 days, then try again. You can also try switching to a formulation without iron in early pregnancy (a women's once daily vitamin).   Medication and other remedies  Unisom, taken as directed, may be used with Vitamin B6. Take 25mg of unisom at night, this can make you drowsy.   Chelsie tablets, 250 mg, 2-4 times per day   Acupressure Wrist Bands  (Sea Bands)   Peppermint or ginger decaffeinated tea   Peppermint gum or candy  Inform your doctor if:  You cannot keep any solid food down for 24 hours.   You cannot keep liquids down.   You are losing weight.   You are running a temperature greater than 100.4  F.    Common Ailments:  Below is a list of medications that are safe to take in pregnancy. This is not everything that is safe, but merely a list of over the counter options to get you through frequently experienced symptoms.     Upper Respiratory Infections:  Sinus congestion and colds - Plain Sudafed, Tylenol Sinus  Antihistamines -  Claritin, Benadryl, Zyrtec  Avoid nasal sprays, except for Saline only.  Sore Throat:  Lozenges - Cepacol, Chloraseptic  Cough drops - Halls, Vicks, or lemon drops    Headache, Pain, or Fever:  Tylenol or other acetaminophen products: 650-1000 mg every 6-8 hours (up to 3 gm/24 hours) as needed.   A single serving of caffeine   Increase fluid consumption.  Try a short nap. If not relieved, call the office.     Heartburn:  Sodium-free antacids - Gaviscon, Maalox, Mylanta, Tums  Acid Reflux - Prilosec, Zantac 75 mg 2 times daily  Gas: Simethicone products - Gas-X    Constipation:  Fiber supplements - Fibercon, Metamucil (powder, capsules, or wafers)  Stool softeners - Colace (Docusate Sodium),   Laxatives -Milk of Magnesia, Senna, Miralax  Diarrhea:  Imodium, Imodium AD  Avoid dairy products and stop prenatal vitamins until diarrhea subsides. If not resolved in 24-36 hours, call the office.    Insect Bites and Rashes:  Lotions - Calamine, Caladryl, Hydrocortisone 1%, DEET bug spray  Sprays - DEET bug spray  If rash is unusual or persists, call the office.    Hemorrhoids:  Preparation H, Anusol, Tucks pads      Call the clinic ([facility] [phone], [place] [phone]) right away with any of the following concerns. These phones are answered at all hours:    1.   Severe abdominal pain or cramping, that does not go away with  "rest and hydration    2.   Vaginal bleeding.      Continue your prenatal vitamins daily.    Please call with any additional concerns that your have, and continue your prenatal visits every four weeks!           Follow-ups after your visit        Who to contact     If you have questions or need follow up information about today's clinic visit or your schedule please contact [facility] directly at [phone].  Normal or non-critical lab and imaging results will be communicated to you by [employer], letter or phone within 4 business days after the clinic has received the results. If you do not hear from us within 7 days, please contact the clinic through [employer] or phone. If you have a critical or abnormal lab result, we will notify you by phone as soon as possible.  Submit refill requests through [employer] or call your pharmacy and they will forward the refill request to us. Please allow 3 business days for your refill to be completed.          Additional Information About Your Visit        [employer] Information     [employer] lets you send messages to your doctor, view your test results, renew your prescriptions, schedule appointments and more. To sign up, go to [email or web address] . Click on \"Log in\" on the left side of the screen, which will take you to the Welcome page. Then click on \"Sign up Now\" on the right side of the page.     You will be asked to enter the access code listed below, as well as some personal information. Please follow the directions to create your username and password.     Your access code is: [email or web address]  Expires: 2018  3:20 PM     Your access code will  in 90 days. If you need help or a new code, please call your [place] clinic or [phone].        Care EveryWhere ID     This is your Care EveryWhere ID. This could be used by other organizations to access your [facility] medical records  [id number]        Your Vitals Were     Pulse Height Last Period BMI " "(Body Mass Index)          84 5' 6\" (1.676 m) 12/13/2017 25.6 kg/m2         Blood Pressure from Last 3 Encounters:   04/04/18 106/62   03/14/18 96/62   06/26/17 108/64    Weight from Last 3 Encounters:   04/04/18 158 lb 9.6 oz (71.9 kg)   06/26/17 162 lb (73.5 kg)   04/04/17 156 lb 9.6 oz (71 kg)              Today, you had the following     No orders found for display       Primary Care Provider Office Phone # Fax #    [facility] [phone] [address] [facility] [phone]        Equal Access to Services     TAB ORDONEZ : Hadii antonietta magdaleno hadasho Soomaali, waaxda luqadaha, qaybta kaalmada adeegyada, mony rashidin haypattie yo . So [facility] [phone].    ATENCIÓN: Si habla español, tiene a awad disposición servicios gratuitos de asistencia lingüística. Llame al [phone].    We comply with applicable federal civil rights laws and [place] laws. We do not discriminate on the basis of race, color, national origin, age, disability, sex, sexual orientation, or gender identity.            Thank you!     Thank you for choosing [facility]  for your care. Our goal is always to provide you with excellent care. Hearing back from our patients is one way we can continue to improve our services. Please take a few minutes to complete the written survey that you may receive in the mail after your visit with us. Thank you!             Your Updated Medication List - Protect others around you: Learn how to safely use, store and throw away your medicines at www.disposemymeds.org.          This list is accurate as of 4/4/18  4:05 PM.  Always use your most recent med list.                   Brand Name Dispense Instructions for use Diagnosis    prenatal multivitamin plus iron 27-0.8 MG Tabs per tablet     100 tablet    Take 1 tablet by mouth daily    Supervision of normal pregnancy         "

## 2018-04-04 NOTE — NURSING NOTE
"Chief Complaint   Patient presents with     Prenatal Care     1st OB, 17 weeks 3 days       Initial /62 (BP Location: Right arm, Patient Position: Chair, Cuff Size: Adult Regular)  Pulse 84  Ht 5' 6\" (1.676 m)  Wt 158 lb 9.6 oz (71.9 kg)  LMP 12/13/2017  BMI 25.6 kg/m2 Estimated body mass index is 25.6 kg/(m^2) as calculated from the following:    Height as of this encounter: 5' 6\" (1.676 m).    Weight as of this encounter: 158 lb 9.6 oz (71.9 kg).  Medication Reconciliation: complete     Ying Dalton CMA      "

## 2018-04-04 NOTE — PROGRESS NOTES
New OB Visit  Tammy Douglas   2018   17w3d     Subjective: Tammy Douglas 27 year old  at 17w3d dated by date of conception, 15w3d US here today for initial OB visit. Patient feeling well, no HA, constipation, GERD. 1 yeast infection resolved with monistat. Denies cramping and vaginal spotting. This pregnancy is a surprise, she is happy.     Gyn History:   Menses: LMP: Patient's last menstrual period was 2017. regular  Sexually transmitted disease history: none.    Occupation:   Diet: eats lots of fish and vegetables    Since her last LMP she denies use of alcohol, tobacco and street drugs.    OBhx:  x 1  Obstetric History       T1      L1     SAB0   TAB0   Ectopic0   Multiple0   Live Births1       # Outcome Date GA Lbr Satnam/2nd Weight Sex Delivery Anes PTL Lv   2 Current            1 Term 17 41w0d 02:55 / 02:17 6 lb 5.9 oz (2.89 kg) F Vag-Spont EPI N CARROLL      Name: CHOCO,BABY1 TAMMY      Apgar1:  8                Apgar5: 9          ROS: Ten point review of systems was reviewed and negative except the above.    HISTORY:  History reviewed. No pertinent past medical history.  History reviewed. No pertinent surgical history.  Family History   Problem Relation Age of Onset     Unknown/Adopted Maternal Grandfather           Unknown/Adopted Paternal Grandfather      Social History     Social History     Marital status:      Spouse name: N/A     Number of children: N/A     Years of education: N/A     Social History Main Topics     Smoking status: Never Smoker     Smokeless tobacco: Never Used     Alcohol use No     Drug use: No     Sexual activity: No      Comment: 07 alta rankin     Other Topics Concern      Service No     Blood Transfusions No     Caffeine Concern No     Occupational Exposure No     Hobby Hazards No     Sleep Concern No     Stress Concern No     Weight Concern No     Special Diet No     Back Care No     Exercise No     Bike Helmet No      "Seat Belt Yes     Self-Exams No     Social History Narrative     Current Outpatient Prescriptions   Medication Sig     Prenatal Vit-Fe Fumarate-FA (PRENATAL MULTIVITAMIN PLUS IRON) 27-0.8 MG TABS per tablet Take 1 tablet by mouth daily     No current facility-administered medications for this visit.      No Known Allergies    Past medical, surgical, social and family history were reviewed and updated in EPIC.      EXAM:  /62 (BP Location: Right arm, Patient Position: Chair, Cuff Size: Adult Regular)  Pulse 84  Ht 5' 6\" (1.676 m)  Wt 158 lb 9.6 oz (71.9 kg)  LMP 2017  BMI 25.6 kg/m2     Gen:  no acute distress, comfortable  HENT: No scleral injection or icterus  CV: Regular rate and rhythm, no murmur  Resp: CTAB, Normal work of breathing, no cough  GI: Abdomen soft, non-tender. No masses, organomegaly  Skin: No suspicious lesions or rashes  Psychiatric: mentation appears normal and affect bright       Recent Labs   Lab Test  18   1506   ABO  B   RH  Pos   AS  Neg     Rhogam not indicated     Recent Labs   Lab Test  18   1505  17   1545   HEPBANG  Nonreactive   --    HIAGAB  Nonreactive   --    GBS   --   Negative  No GBS DNA detected, presumed negative for GBS or number of bacteria may be   below the limit of detection of the assay.   Assay performed on incubated broth culture of specimen using [employer] real-time   PCR.     RUQIGG  19   --        Treponemal antibody neg    CBC RESULTS:   Recent Labs   Lab Test  18   1505   WBC  9.4   RBC  4.52   HGB  11.9   HCT  37.0   MCV  82   MCH  26.3*   MCHC  32.2   RDW  14.4   PLT  344       ASSESSMENT:  27 year old  at 17w3d dated by 15w US here for NOB visit.      PLAN:    1)Prenatal labs reviewed. She has no questions.  2) EDUCATION : RECOMMENDED WEIGHT GAIN: 25-35 lbs given Body mass index is 25.6 kg/(m^2)..   - Instructed on best evidence for: healthy diet and foods to avoid; exercise and activity during pregnancy; and " maintenance of a generally healthy lifestyle. Reviewed early pregnancy education, provider coverage, labor and delivery, and prenatal visits.  Discussed the harms, benefits, side effects and alternative therapies for current prescribed and OTC medications.  - recommend PNV  3) Discussed options for screening for chromosomal anomalies, including first screen, noninvasive prenatal testing, CVS/amniocentesis, quad screen, and ultrasound at 18-20 weeks. She is electing ultrasound at 18-20 weeks.  4) hx recurrent yeast infections in prior pregnancy, 1 yeast infection this pregnancy resolved with monistat.     Follow up in 4 weeks. She is encouraged to call sooner with questions or concerns.    Laurel Diggs MD   Obstetrics and Gynecology

## 2018-04-04 NOTE — PATIENT INSTRUCTIONS
Early Pregnancy Information:    Rest  Your body requires more sleep in early pregnancy. Try to get plenty of sleep at night or take a short nap during the day. Being tired can often trigger nausea. If your nausea is worse in the evening, try taking a nap before dinner.    Exercise  Energy levels are normally low in early pregnancy and exercise may be the last thing on your mind, but getting out and walking briskly for 30 minutes each day will increase metabolism, relieve stress and psychologically improve your outlook. Walking after meals can improve heartburn, constipation, and indigestion.   - You can generally continue the same exercise routine in early pregnancy that you were doing prior to pregnancy. If you were not getting daily exercise before, now is a great time to start by walking or biking for 30 minutes daily.  - Any exercise that causes cramping, bleeding, or pain needs to be stopped right away. Please report to your doctor.    [place]  It is safe to continue sexual activity in pregnancy. If you experience bleeding or pain with intercourse, please abstain until you are able to discuss this with your doctor.       Nausea & Vomiting  Women experience this problem in varying degrees during pregnancy and you may have different experiences in subsequent pregnancies. Some women experience  morning sickness,  but it is also common to experience nausea any time throughout the day.  Listen to your body and eat the kinds of foods that make you feel best.  Suggestions for Diet  The most important rule is to eat small amounts often - even if you are not hungry. Try not to go more than three hours without eating during the day or ten hours at night. An empty stomach triggers nausea.   Eat slowly and avoid foods that are spicy or high in fat. These are difficult to digest. Do not overfill your stomach.   Drink fruit juices, water and milk between meals.   Eat a few crackers, dry toast or vanilla wafers before  getting out of bed in the morning. Stay in bed 15-20 minutes after eating.  Give yourself extra time in the morning.   Do not brush your teeth until you have been up for a while.   Do not skip breakfast.   Have a snack at bedtime that includes both carbohydrates and protein, e.g., peanut butter toast or hot chocolate made with milk.  A specific food or drink may trigger nausea in one woman and alleviate it in another. Find out what works best for you and eliminate the foods that cause nausea.   Most women tolerate ice cold drinks and foods best. Sherbet and fruit juices are good examples.   Try avoid coffee and products containing caffeine; it increases stomach acid. About 1 cup of coffee daily is considered safe in pregnancy, but this may be triggering your nausea.  Avoid smoking: it also increases stomach acid.    Vitamins  Vitamins B6 and Vitamin C may help improve nausea. There have been no definite studies to prove this effective, but some women do improve.   To prevent nausea take: 25 mg of Vitamin B6 daily. You can take this up to 3 times daily. You may have to cut a tablet in half to get to 25mg   Take: 500 mg. Vitamin C daily.   Yogurt is a good source of the B Vitamins.   If taking your prenatal vitamin increases or causes nausea, stop for 7-10 days, then try again. You can also try switching to a formulation without iron in early pregnancy (a women's once daily vitamin).   Medication and other remedies  Unisom, taken as directed, may be used with Vitamin B6. Take 25mg of unisom at night, this can make you drowsy.   Chelsie tablets, 250 mg, 2-4 times per day   Acupressure Wrist Bands (Sea Bands)   Peppermint or chelsie decaffeinated tea   Peppermint gum or candy  Inform your doctor if:  You cannot keep any solid food down for 24 hours.   You cannot keep liquids down.   You are losing weight.   You are running a temperature greater than 100.4  F.    Common Ailments:  Below is a list of medications that are  safe to take in pregnancy. This is not everything that is safe, but merely a list of over the counter options to get you through frequently experienced symptoms.     Upper Respiratory Infections:  Sinus congestion and colds - Plain Sudafed, Tylenol Sinus  Antihistamines -  Claritin, Benadryl, Zyrtec  Avoid nasal sprays, except for Saline only.  Sore Throat:  Lozenges - Cepacol, Chloraseptic  Cough drops - Halls, Vicks, or lemon drops    Headache, Pain, or Fever:  Tylenol or other acetaminophen products: 650-1000 mg every 6-8 hours (up to 3 gm/24 hours) as needed.   A single serving of caffeine   Increase fluid consumption.  Try a short nap. If not relieved, call the office.     Heartburn:  Sodium-free antacids - Gaviscon, Maalox, Mylanta, Tums  Acid Reflux - Prilosec, Zantac 75 mg 2 times daily  Gas: Simethicone products - Gas-X    Constipation:  Fiber supplements - Fibercon, Metamucil (powder, capsules, or wafers)  Stool softeners - Colace (Docusate Sodium),   Laxatives -Milk of Magnesia, Senna, Miralax  Diarrhea:  Imodium, Imodium AD  Avoid dairy products and stop prenatal vitamins until diarrhea subsides. If not resolved in 24-36 hours, call the office.    Insect Bites and Rashes:  Lotions - Calamine, Caladryl, Hydrocortisone 1%, DEET bug spray  Sprays - DEET bug spray  If rash is unusual or persists, call the office.    Hemorrhoids:  Preparation H, Anusol, Tucks pads      Call the clinic ([facility] [phone], [place] [phone]) right away with any of the following concerns. These phones are answered at all hours:    1.   Severe abdominal pain or cramping, that does not go away with rest and hydration    2.   Vaginal bleeding.      Continue your prenatal vitamins daily.    Please call with any additional concerns that your have, and continue your prenatal visits every four weeks!

## 2018-04-06 LAB
C TRACH DNA SPEC QL NAA+PROBE: NEGATIVE
N GONORRHOEA DNA SPEC QL NAA+PROBE: NEGATIVE
SPECIMEN SOURCE: NORMAL
SPECIMEN SOURCE: NORMAL

## 2018-04-25 ENCOUNTER — RADIANT APPOINTMENT (OUTPATIENT)
Dept: ULTRASOUND IMAGING | Facility: CLINIC | Age: 28
End: 2018-04-25
Attending: OBSTETRICS & GYNECOLOGY
Payer: COMMERCIAL

## 2018-04-25 DIAGNOSIS — Z34.80 SUPERVISION OF OTHER NORMAL PREGNANCY, ANTEPARTUM: ICD-10-CM

## 2018-04-25 PROCEDURE — 76805 OB US >/= 14 WKS SNGL FETUS: CPT | Performed by: OBSTETRICS & GYNECOLOGY

## 2018-05-01 ENCOUNTER — TELEPHONE (OUTPATIENT)
Dept: OBGYN | Facility: CLINIC | Age: 28
End: 2018-05-01

## 2018-05-01 DIAGNOSIS — Z34.80 SUPERVISION OF OTHER NORMAL PREGNANCY, ANTEPARTUM: Primary | ICD-10-CM

## 2018-05-01 NOTE — PROGRESS NOTES
Please call patient with normal US but incomplete views of the face/lips. She will need a repeat US in 1-2 weeks. Please make sure she also has follow up scheduled with me.     Thanks,  Laurel Diggs MD

## 2018-05-10 ENCOUNTER — RADIANT APPOINTMENT (OUTPATIENT)
Dept: ULTRASOUND IMAGING | Facility: CLINIC | Age: 28
End: 2018-05-10
Attending: OBSTETRICS & GYNECOLOGY
Payer: COMMERCIAL

## 2018-05-10 DIAGNOSIS — Z34.80 SUPERVISION OF OTHER NORMAL PREGNANCY, ANTEPARTUM: ICD-10-CM

## 2018-05-10 PROCEDURE — 76816 OB US FOLLOW-UP PER FETUS: CPT | Performed by: OBSTETRICS & GYNECOLOGY

## 2018-05-14 ENCOUNTER — PRENATAL OFFICE VISIT (OUTPATIENT)
Dept: OBGYN | Facility: CLINIC | Age: 28
End: 2018-05-14
Payer: COMMERCIAL

## 2018-05-14 VITALS — DIASTOLIC BLOOD PRESSURE: 60 MMHG | SYSTOLIC BLOOD PRESSURE: 102 MMHG | BODY MASS INDEX: 25.91 KG/M2 | WEIGHT: 160.5 LBS

## 2018-05-14 DIAGNOSIS — Z34.80 SUPERVISION OF OTHER NORMAL PREGNANCY, ANTEPARTUM: ICD-10-CM

## 2018-05-14 DIAGNOSIS — Z34.82 ENCOUNTER FOR SUPERVISION OF OTHER NORMAL PREGNANCY IN SECOND TRIMESTER: ICD-10-CM

## 2018-05-14 PROCEDURE — 99207 ZZC PRENATAL VISIT: CPT | Performed by: ADVANCED PRACTICE MIDWIFE

## 2018-05-14 RX ORDER — PRENATAL VIT/IRON FUM/FOLIC AC 27MG-0.8MG
1 TABLET ORAL DAILY
Qty: 100 TABLET | Refills: 3 | Status: SHIPPED | OUTPATIENT
Start: 2018-05-14 | End: 2018-07-26

## 2018-05-14 NOTE — NURSING NOTE
"Chief Complaint   Patient presents with     Prenatal Care     23w1d       Initial /60 (BP Location: Left arm, Patient Position: Chair, Cuff Size: Adult Regular)  Wt 160 lb 8 oz (72.8 kg)  LMP 2017  BMI 25.91 kg/m2 Estimated body mass index is 25.91 kg/(m^2) as calculated from the following:    Height as of 18: 5' 6\" (1.676 m).    Weight as of this encounter: 160 lb 8 oz (72.8 kg).  BP completed using cuff size: regular      23w1d           "

## 2018-05-14 NOTE — MR AVS SNAPSHOT
After Visit Summary   5/14/2018    Thanh Douglas    MRN: [id number]           Patient Information     Date Of Birth          1990        Visit Information        Provider Department      5/14/2018 12:45 PM Mirtha Landers CNM; [employer] [facility]        Today's Diagnoses     Supervision of other normal pregnancy, antepartum        Encounter for supervision of other normal pregnancy in second trimester           Follow-ups after your visit        Follow-up notes from your care team     Return in about 4 weeks (around 6/11/2018) for Prenatal Visit.      Your next 10 appointments already scheduled     Jun 20, 2018  2:45 PM CDT   US OB > 14 WEEKS COMPLETE SINGLE with OXUS1   [facility]    [address] [place] [address] [phone]           Please bring a list of your medicines (including vitamins, minerals and over-the-counter drugs). Also, tell your doctor about any allergies you may have. Wear comfortable clothes and leave your valuables at home.  If you re less than 20 weeks drink four 8-ounce glasses of fluid an hour before your exam. If you need to empty your bladder before your exam, try to release only a little urine. Then, drink another glass of fluid.  You may have up to two family members in the exam room. If you bring a small child, an adult must be there to care for him or her.  Please call the Imaging Department at your exam site with any questions.            Jun 20, 2018  3:45 PM CDT   ESTABLISHED PRENATAL with Laurel Diggs MD   [facility]    [address] [place] [address] [phone]              Future tests that were ordered for you today     Open Future Orders        Priority Expected Expires Ordered    US OB > 14 Weeks Complete Single Routine 6/18/2018 5/14/2019  "2018            Who to contact     If you have questions or need follow up information about today's clinic visit or your schedule please contact [facility] directly at [phone].  Normal or non-critical lab and imaging results will be communicated to you by MyChart, letter or phone within 4 business days after the clinic has received the results. If you do not hear from us within 7 days, please contact the clinic through MyChart or phone. If you have a critical or abnormal lab result, we will notify you by phone as soon as possible.  Submit refill requests through [email or web address] or call your pharmacy and they will forward the refill request to us. Please allow 3 business days for your refill to be completed.          Additional Information About Your Visit        [employer] to Face Live Information     [email or web address] lets you send messages to your doctor, view your test results, renew your prescriptions, schedule appointments and more. To sign up, go to [email or web address] . Click on \"Log in\" on the left side of the screen, which will take you to the Welcome page. Then click on \"Sign up Now\" on the right side of the page.     You will be asked to enter the access code listed below, as well as some personal information. Please follow the directions to create your username and password.     Your access code is: [email or web address]  Expires: 2018  3:20 PM     Your access code will  in 90 days. If you need help or a new code, please call your [place] clinic or [phone].        Care EveryWhere ID     This is your Care EveryWhere ID. This could be used by other organizations to access your [place] medical records  [id number]        Your Vitals Were     Last Period BMI (Body Mass Index)                2017 25.91 kg/m2           Blood Pressure from Last 3 Encounters:   18 102/60   18 106/62   18 96/62    Weight from Last 3 Encounters:   18 160 lb 8 oz (72.8 kg)   18 " 158 lb 9.6 oz (71.9 kg)   06/26/17 162 lb (73.5 kg)                 Where to get your medicines      Some of these will need a paper prescription and others can be bought over the counter.  Ask your nurse if you have questions.     Bring a paper prescription for each of these medications     prenatal multivitamin plus iron 27-0.8 MG Tabs per tablet          Primary Care Provider Office Phone # Fax #    [facility] [phone] [address] [facility] [address]        Equal Access to Services     TAB ORDONEZ : Hadii aad ku hadasho Soomaali, waaxda luqadaha, qaybta kaalmada adeegyada, mony yo . So [facility] [phone].    ATENCIÓN: Si habla español, tiene a awad disposición servicios gratuitos de asistencia lingüística. Llame al [phone].    We comply with applicable federal civil rights laws and [place] laws. We do not discriminate on the basis of race, color, national origin, age, disability, sex, sexual orientation, or gender identity.            Thank you!     Thank you for choosing [facility]  for your care. Our goal is always to provide you with excellent care. Hearing back from our patients is one way we can continue to improve our services. Please take a few minutes to complete the written survey that you may receive in the mail after your visit with us. Thank you!             Your Updated Medication List - Protect others around you: Learn how to safely use, store and throw away your medicines at www.disposemymeds.org.          This list is accurate as of 5/14/18  1:28 PM.  Always use your most recent med list.                   Brand Name Dispense Instructions for use Diagnosis    prenatal multivitamin plus iron 27-0.8 MG Tabs per tablet     100 tablet    Take 1 tablet by mouth daily    Encounter for supervision of other normal pregnancy in second trimester

## 2018-05-14 NOTE — PROGRESS NOTES
Please call patient with low lying placenta, will need repeat US at 28-30w, please place order.     Thanks,  Laurel Diggs MD

## 2018-05-14 NOTE — PROGRESS NOTES
Maged  present    S: Feels well,  Baby active.  Denies uterine cramping, vaginal bleeding or leaking of fluid. Some questions about fasting during Ramadan coming up this week.   O: Vitals: /60 (BP Location: Left arm, Patient Position: Chair, Cuff Size: Adult Regular)  Wt 160 lb 8 oz (72.8 kg)  LMP 2017  BMI 25.91 kg/m2  BMI= Body mass index is 25.91 kg/(m^2).  Exam:  Constitutional: healthy, alert and no distress  Respiratory: respirations even and unlabored  Gastrointestinal: Abdomen soft, non-tender. Fundus measures appropriate for gestational age. Fetal heart tones hear without difficulty and within normal limits  Psychiatric: mentation appears normal and affect normal/bright  A:    Diagnosis Comments   1. Supervision of other normal pregnancy, antepartum  US OB > 14 Weeks Complete Single    2. Encounter for supervision of other normal pregnancy in second trimester  Prenatal Vit-Fe Fumarate-FA (PRENATAL MULTIVITAMIN PLUS IRON) 27-0.8 MG TABS per tablet      P: Discussed fasting during daylight hours in ada. Discussed that it is important to stay well hydrated and nourished. Encouraged to watch for signs of dehydration and  LABOR.   Discussed GCT/repeat RPR for next visit, handout provided, reminded of longer appointment.  Plans GCT after Ramadan at approx 29 weeks.  Follow up US for low lying placenta 28-30 weeks  Need for Rhogam? No, to be done next visit   Encouraged patient to call with any questions or concerns.  Return to clinic 4 weeks    Mirtha Landers CNM, ROSITA-BC

## 2018-06-20 ENCOUNTER — RADIANT APPOINTMENT (OUTPATIENT)
Dept: ULTRASOUND IMAGING | Facility: CLINIC | Age: 28
End: 2018-06-20
Attending: ADVANCED PRACTICE MIDWIFE
Payer: COMMERCIAL

## 2018-06-20 DIAGNOSIS — Z34.80 SUPERVISION OF OTHER NORMAL PREGNANCY, ANTEPARTUM: ICD-10-CM

## 2018-06-20 PROCEDURE — 76816 OB US FOLLOW-UP PER FETUS: CPT | Performed by: FAMILY MEDICINE

## 2018-06-25 ENCOUNTER — PRENATAL OFFICE VISIT (OUTPATIENT)
Dept: OBGYN | Facility: CLINIC | Age: 28
End: 2018-06-25
Payer: COMMERCIAL

## 2018-06-25 VITALS — WEIGHT: 166 LBS | BODY MASS INDEX: 26.79 KG/M2 | DIASTOLIC BLOOD PRESSURE: 64 MMHG | SYSTOLIC BLOOD PRESSURE: 130 MMHG

## 2018-06-25 DIAGNOSIS — Z34.83 ENCOUNTER FOR SUPERVISION OF OTHER NORMAL PREGNANCY IN THIRD TRIMESTER: ICD-10-CM

## 2018-06-25 LAB
ERYTHROCYTE [DISTWIDTH] IN BLOOD BY AUTOMATED COUNT: 13.5 % (ref 10–15)
GLUCOSE 1H P 50 G GLC PO SERPL-MCNC: 116 MG/DL (ref 60–129)
HCT VFR BLD AUTO: 34 % (ref 35–47)
HGB BLD-MCNC: 10.8 G/DL (ref 11.7–15.7)
MCH RBC QN AUTO: 27 PG (ref 26.5–33)
MCHC RBC AUTO-ENTMCNC: 31.8 G/DL (ref 31.5–36.5)
MCV RBC AUTO: 85 FL (ref 78–100)
PLATELET # BLD AUTO: 331 10E9/L (ref 150–450)
RBC # BLD AUTO: 4 10E12/L (ref 3.8–5.2)
WBC # BLD AUTO: 10 10E9/L (ref 4–11)

## 2018-06-25 PROCEDURE — 86780 TREPONEMA PALLIDUM: CPT | Performed by: ADVANCED PRACTICE MIDWIFE

## 2018-06-25 PROCEDURE — 85027 COMPLETE CBC AUTOMATED: CPT | Performed by: ADVANCED PRACTICE MIDWIFE

## 2018-06-25 PROCEDURE — 82950 GLUCOSE TEST: CPT | Performed by: ADVANCED PRACTICE MIDWIFE

## 2018-06-25 PROCEDURE — 36415 COLL VENOUS BLD VENIPUNCTURE: CPT | Performed by: ADVANCED PRACTICE MIDWIFE

## 2018-06-25 PROCEDURE — 99207 ZZC PRENATAL VISIT: CPT | Performed by: ADVANCED PRACTICE MIDWIFE

## 2018-06-25 RX ORDER — PRENATAL VIT/IRON FUM/FOLIC AC 27MG-0.8MG
1 TABLET ORAL DAILY
Qty: 100 TABLET | Refills: 3 | Status: SHIPPED | OUTPATIENT
Start: 2018-06-25 | End: 2018-10-25

## 2018-06-25 NOTE — NURSING NOTE
"Chief Complaint   Patient presents with     Prenatal Care       Initial /64  Wt 166 lb (75.3 kg)  LMP 2017  BMI 26.79 kg/m2 Estimated body mass index is 26.79 kg/(m^2) as calculated from the following:    Height as of 18: 5' 6\" (1.676 m).    Weight as of this encounter: 166 lb (75.3 kg).  BP completed using cuff size: regular      29w1d    Mojgan Marinelli CMA               "

## 2018-06-25 NOTE — MR AVS SNAPSHOT
After Visit Summary   6/25/2018    Thanh Douglas    MRN: [id number]           Patient Information     Date Of Birth          1990        Visit Information        Provider Department      6/25/2018 2:00 PM Mirtha Landers CNM; [employer] [facility]        Today's Diagnoses     Encounter for supervision of other normal pregnancy in third trimester           Follow-ups after your visit        Follow-up notes from your care team     Return in about 2 weeks (around 7/9/2018) for Prenatal Visit.      Who to contact     If you have questions or need follow up information about today's clinic visit or your schedule please contact [facility] directly at [phone].  Normal or non-critical lab and imaging results will be communicated to you by MyChart, letter or phone within 4 business days after the clinic has received the results. If you do not hear from us within 7 days, please contact the clinic through MyChart or phone. If you have a critical or abnormal lab result, we will notify you by phone as soon as possible.  Submit refill requests through [employer] or call your pharmacy and they will forward the refill request to us. Please allow 3 business days for your refill to be completed.          Additional Information About Your Visit        Care EveryWhere ID     This is your Care EveryWhere ID. This could be used by other organizations to access your [place] medical records  [id number]        Your Vitals Were     Last Period BMI (Body Mass Index)                12/13/2017 26.79 kg/m2           Blood Pressure from Last 3 Encounters:   06/25/18 130/64   05/14/18 102/60   04/04/18 106/62    Weight from Last 3 Encounters:   06/25/18 166 lb (75.3 kg)   05/14/18 160 lb 8 oz (72.8 kg)   04/04/18 158 lb 9.6 oz (71.9 kg)              We Performed the Following     CBC with platelets     Glucose tolerance, gest screen, 1 hour      Treponema Abs w Reflex to RPR and Titer          Today's Medication Changes          These changes are accurate as of 6/25/18  3:38 PM.  If you have any questions, ask your nurse or doctor.               These medicines have changed or have updated prescriptions.        Dose/Directions    * prenatal multivitamin plus iron 27-0.8 MG Tabs per tablet   This may have changed:  Another medication with the same name was added. Make sure you understand how and when to take each.   Used for:  Encounter for supervision of other normal pregnancy in second trimester   Changed by:  Mirtha Landers CNM        Dose:  1 tablet   Take 1 tablet by mouth daily   Quantity:  100 tablet   Refills:  3       * prenatal multivitamin plus iron 27-0.8 MG Tabs per tablet   This may have changed:  You were already taking a medication with the same name, and this prescription was added. Make sure you understand how and when to take each.   Used for:  Encounter for supervision of other normal pregnancy in third trimester   Changed by:  Mirtha Landers CNM        Dose:  1 tablet   Take 1 tablet by mouth daily   Quantity:  100 tablet   Refills:  3       * Notice:  This list has 2 medication(s) that are the same as other medications prescribed for you. Read the directions carefully, and ask your doctor or other care provider to review them with you.         Where to get your medicines      These medications were sent to [facility] [address] [address]     Phone:  [phone]     prenatal multivitamin plus iron 27-0.8 MG Tabs per tablet                Primary Care Provider Office Phone # Fax #    [facility] [phone] [address] [facility] [address]        Equal Access to Services     TAB ORDONEZ AH: Amy Martin, scar sepulveda, mony manning  maira grayaachilango ah. So [facility] [phone].    ATENCIÓN: Si jerrica reynolds, tiene a awad disposición servicios gratuitos de asistencia lingüística. Bulmaro al [phone].    We comply with applicable federal civil rights laws and [place] laws. We do not discriminate on the basis of race, color, national origin, age, disability, sex, sexual orientation, or gender identity.            Thank you!     Thank you for choosing [facility]  for your care. Our goal is always to provide you with excellent care. Hearing back from our patients is one way we can continue to improve our services. Please take a few minutes to complete the written survey that you may receive in the mail after your visit with us. Thank you!             Your Updated Medication List - Protect others around you: Learn how to safely use, store and throw away your medicines at www.disposemymeds.org.          This list is accurate as of 6/25/18  3:38 PM.  Always use your most recent med list.                   Brand Name Dispense Instructions for use Diagnosis    * prenatal multivitamin plus iron 27-0.8 MG Tabs per tablet     100 tablet    Take 1 tablet by mouth daily    Encounter for supervision of other normal pregnancy in second trimester       * prenatal multivitamin plus iron 27-0.8 MG Tabs per tablet     100 tablet    Take 1 tablet by mouth daily    Encounter for supervision of other normal pregnancy in third trimester       * Notice:  This list has 2 medication(s) that are the same as other medications prescribed for you. Read the directions carefully, and ask your doctor or other care provider to review them with you.

## 2018-06-25 NOTE — PROGRESS NOTES
present    S: Feels well,  Baby active.  Denies uterine cramping, vaginal bleeding or leaking of fluid  O: Vitals: /64  Wt 166 lb (75.3 kg)  LMP 12/13/2017  BMI 26.79 kg/m2  BMI= Body mass index is 26.79 kg/(m^2).  Exam:  Constitutional: healthy, alert and no distress  Respiratory: respirations even and unlabored  Gastrointestinal: Abdomen soft, non-tender. Fundus measures appropriate for gestational age. Fetal heart tones hear without difficulty and within normal limits  : Deferred  Psychiatric: mentation appears normal and affect normal/bright  A:     ICD-10-CM    1. Encounter for supervision of other normal pregnancy in third trimester Z34.83 Glucose tolerance, gest screen, 1 hour     CBC with platelets     Treponema Abs w Reflex to RPR and Titer     Prenatal Vit-Fe Fumarate-FA (PRENATAL MULTIVITAMIN PLUS IRON) 27-0.8 MG TABS per tablet     P: GCT/repeat RPR today, handout provided.  Considering TDap, will follow up at next visit.  Need for Rhogam? No.   Discussed warning signs and when to call  Discussed plans for labor  Encouraged patient to call with any questions or concerns.  Return to clinic 2 weeks    Mirtha Ladners CNM, ROSITA-BC

## 2018-06-26 LAB — T PALLIDUM AB SER QL: NONREACTIVE

## 2018-07-26 ENCOUNTER — PRENATAL OFFICE VISIT (OUTPATIENT)
Dept: OBGYN | Facility: CLINIC | Age: 28
End: 2018-07-26
Payer: COMMERCIAL

## 2018-07-26 VITALS — BODY MASS INDEX: 27.92 KG/M2 | DIASTOLIC BLOOD PRESSURE: 70 MMHG | WEIGHT: 173 LBS | SYSTOLIC BLOOD PRESSURE: 100 MMHG

## 2018-07-26 DIAGNOSIS — O99.013 ANEMIA DURING PREGNANCY IN THIRD TRIMESTER: ICD-10-CM

## 2018-07-26 DIAGNOSIS — Z34.83 ENCOUNTER FOR SUPERVISION OF OTHER NORMAL PREGNANCY IN THIRD TRIMESTER: Primary | ICD-10-CM

## 2018-07-26 PROCEDURE — 99207 ZZC PRENATAL VISIT: CPT | Performed by: OBSTETRICS & GYNECOLOGY

## 2018-07-26 NOTE — NURSING NOTE
"Chief Complaint   Patient presents with     Prenatal Care       Initial /70  Wt 173 lb (78.5 kg)  LMP 2017  BMI 27.92 kg/m2 Estimated body mass index is 27.92 kg/(m^2) as calculated from the following:    Height as of 18: 5' 6\" (1.676 m).    Weight as of this encounter: 173 lb (78.5 kg).  BP completed using cuff size: regular        The following HM Due: NONE      +fetal movement  -swelling    Sarah Andujar, CMA      "

## 2018-07-26 NOTE — PROGRESS NOTES
27 year old  at 33w4d weeks presents to the clinic for a routine prenatal visit.  Feeling well.  No vaginal bleeding, leakage of fluid, or contractions. +FM.    1. Encounter for supervision of other normal pregnancy in third trimester  B+ / RI  Low lying placenta resolved  Declined Tdap today, again stated she will consider before next visit. Benefits reviewed.  Passed glucola    2. Anemia during pregnancy in third trimester  Hgb 10.8  Taking iron once daily    RTC 2 weeks.    Shannan Núñez, DO

## 2018-07-26 NOTE — MR AVS SNAPSHOT
After Visit Summary   7/26/2018    Thanh Douglas    MRN: [id number]           Patient Information     Date Of Birth          1990        Visit Information        Provider Department      7/26/2018 12:45 PM Shannan Núñez DO; [employer] [facility]        Today's Diagnoses     Encounter for supervision of other normal pregnancy in third trimester    -  1    Anemia during pregnancy in third trimester           Follow-ups after your visit        Follow-up notes from your care team     Return in about 2 weeks (around 8/9/2018).      Your next 10 appointments already scheduled     Aug 10, 2018  2:15 PM CDT   ESTABLISHED PRENATAL with Shannan Núñez DO   [facility]    [address]  [facility] [address] [phone]              Who to contact     If you have questions or need follow up information about today's clinic visit or your schedule please contact [facility] directly at [phone].  Normal or non-critical lab and imaging results will be communicated to you by MyChart, letter or phone within 4 business days after the clinic has received the results. If you do not hear from us within 7 days, please contact the clinic through MyChart or phone. If you have a critical or abnormal lab result, we will notify you by phone as soon as possible.  Submit refill requests through [employer] or call your pharmacy and they will forward the refill request to us. Please allow 3 business days for your refill to be completed.          Additional Information About Your Visit        Care EveryWhere ID     This is your Care EveryWhere ID. This could be used by other organizations to access your [place] medical records  [id number]        Your Vitals Were     Last Period BMI (Body Mass Index)                12/13/2017 27.92 kg/m2           Blood Pressure from Last 3 Encounters:   07/26/18 100/70   06/25/18 130/64    05/14/18 102/60    Weight from Last 3 Encounters:   07/26/18 173 lb (78.5 kg)   06/25/18 166 lb (75.3 kg)   05/14/18 160 lb 8 oz (72.8 kg)              Today, you had the following     No orders found for display         Today's Medication Changes          These changes are accurate as of 7/26/18  8:26 PM.  If you have any questions, ask your nurse or doctor.               These medicines have changed or have updated prescriptions.        Dose/Directions    prenatal multivitamin plus iron 27-0.8 MG Tabs per tablet   This may have changed:  Another medication with the same name was removed. Continue taking this medication, and follow the directions you see here.   Used for:  Encounter for supervision of other normal pregnancy in third trimester        Dose:  1 tablet   Take 1 tablet by mouth daily   Quantity:  100 tablet   Refills:  3                Primary Care Provider Office Phone # Fax #    [facility] [phone]       [address] [place] [address]        Equal Access to Services     TAB ORDONEZ AH: Hadii antonietta ku hadasho Soomaali, waaxda luqadaha, qaybta kaalmada adeegyada, waxay rachidin jimmie yo . So [facility] [phone].    ATENCIÓN: Si habla español, tiene a awad disposición servicios gratuitos de asistencia lingüística. Llame al [phone].    We comply with applicable federal civil rights laws and [place] laws. We do not discriminate on the basis of race, color, national origin, age, disability, sex, sexual orientation, or gender identity.            Thank you!     Thank you for choosing [facility]  for your care. Our goal is always to provide you with excellent care. Hearing back from our patients is one way we can continue to improve our services. Please take a few minutes to complete the written survey that you may receive in the mail after your visit with us. Thank you!             Your Updated Medication List - Protect others  around you: Learn how to safely use, store and throw away your medicines at www.disposemymeds.org.          This list is accurate as of 7/26/18  8:26 PM.  Always use your most recent med list.                   Brand Name Dispense Instructions for use Diagnosis    prenatal multivitamin plus iron 27-0.8 MG Tabs per tablet     100 tablet    Take 1 tablet by mouth daily    Encounter for supervision of other normal pregnancy in third trimester

## 2018-08-10 ENCOUNTER — PRENATAL OFFICE VISIT (OUTPATIENT)
Dept: OBGYN | Facility: CLINIC | Age: 28
End: 2018-08-10
Payer: COMMERCIAL

## 2018-08-10 VITALS — BODY MASS INDEX: 28.73 KG/M2 | WEIGHT: 178 LBS | DIASTOLIC BLOOD PRESSURE: 70 MMHG | SYSTOLIC BLOOD PRESSURE: 114 MMHG

## 2018-08-10 DIAGNOSIS — Z36.85 SCREENING, ANTENATAL, FOR STREPTOCOCCUS B: Primary | ICD-10-CM

## 2018-08-10 PROCEDURE — 87653 STREP B DNA AMP PROBE: CPT | Performed by: OBSTETRICS & GYNECOLOGY

## 2018-08-10 PROCEDURE — 99207 ZZC PRENATAL VISIT: CPT | Performed by: OBSTETRICS & GYNECOLOGY

## 2018-08-10 NOTE — NURSING NOTE
"Chief Complaint   Patient presents with     Prenatal Care       Initial Wt 178 lb (80.7 kg)  LMP 2017  BMI 28.73 kg/m2 Estimated body mass index is 28.73 kg/(m^2) as calculated from the following:    Height as of 18: 5' 6\" (1.676 m).    Weight as of this encounter: 178 lb (80.7 kg).  BP completed using cuff size: regular        The following HM Due: NONE    +fetal movement  -swelling  +bravo merrill Andujar, CMA      "

## 2018-08-10 NOTE — LETTER
August 13, 2018      Thanh Douglas  [address]  [place] [address]        Dear MsVinayStella,    We are writing to inform you of your test results. Your GBS is negative. We will discuss this result more at your next visit.    Resulted Orders   Strep, Group B by PCR   Result Value Ref Range    Group B Strep PCR Spec Ezio Vaginal Rectal     Group B Strep PCR Negative NEG^Negative      Comment:      No GBS DNA detected, presumed negative for GBS or number of bacteria may be   below the limit of detection of the assay.  Assay performed on incubated broth culture of specimen using NeuString real-time   PCR.         If you have any questions or concerns, please call the clinic at the number listed above.       Sincerely,        Shannan Núñez, DO

## 2018-08-10 NOTE — MR AVS SNAPSHOT
After Visit Summary   8/10/2018    Thanh Douglas    MRN: [id number]           Patient Information     Date Of Birth          1990        Visit Information        Provider Department      8/10/2018 2:00 PM Shannan Núñez DO; [employer] [facility]        Today's Diagnoses     Screening, , for Streptococcus B    -  1       Follow-ups after your visit        Follow-up notes from your care team     Return in about 1 week (around 2018).      Who to contact     If you have questions or need follow up information about today's clinic visit or your schedule please contact [facility] directly at [phone].  Normal or non-critical lab and imaging results will be communicated to you by MyChart, letter or phone within 4 business days after the clinic has received the results. If you do not hear from us within 7 days, please contact the clinic through MyChart or phone. If you have a critical or abnormal lab result, we will notify you by phone as soon as possible.  Submit refill requests through Mobilinga or call your pharmacy and they will forward the refill request to us. Please allow 3 business days for your refill to be completed.          Additional Information About Your Visit        Care EveryWhere ID     This is your Care EveryWhere ID. This could be used by other organizations to access your [place] medical records  [id number]        Your Vitals Were     Last Period BMI (Body Mass Index)                2017 28.73 kg/m2           Blood Pressure from Last 3 Encounters:   08/10/18 114/70   18 100/70   18 130/64    Weight from Last 3 Encounters:   08/10/18 178 lb (80.7 kg)   18 173 lb (78.5 kg)   18 166 lb (75.3 kg)              We Performed the Following     Strep, Group B by PCR        Primary Care Provider Office Phone # Fax #    [facility] [phone]       [address] [place] [address] NAKUL  [facility] [address]        Equal Access to Services     [facility] MERY : Hadii aad ku hadpilarestelle Martin, wacelsada lucheyenneadaha, qaybta sujathamamony sabillon. So [facility] [phone].    ATENCIÓN: Si habla español, tiene a awad disposición servicios gratuitos de asistencia lingüística. Llame al [phone].    We comply with applicable federal civil rights laws and [place] laws. We do not discriminate on the basis of race, color, national origin, age, disability, sex, sexual orientation, or gender identity.            Thank you!     Thank you for choosing [facility]  for your care. Our goal is always to provide you with excellent care. Hearing back from our patients is one way we can continue to improve our services. Please take a few minutes to complete the written survey that you may receive in the mail after your visit with us. Thank you!             Your Updated Medication List - Protect others around you: Learn how to safely use, store and throw away your medicines at www.disposemymeds.org.          This list is accurate as of 8/10/18  2:55 PM.  Always use your most recent med list.                   Brand Name Dispense Instructions for use Diagnosis    prenatal multivitamin plus iron 27-0.8 MG Tabs per tablet     100 tablet    Take 1 tablet by mouth daily    Encounter for supervision of other normal pregnancy in third trimester

## 2018-08-10 NOTE — PROGRESS NOTES
"27 year old  at 35w5d weeks presents to the clinic for a routine prenatal visit.  Feeling well.  No vaginal bleeding, leakage of fluid, or regular contractions. + [place] Monreal. +FM.    1. Encounter for supervision of other normal pregnancy in third trimester  B+ / RI  Low lying placenta resolved  Declined Tdap today, plans to \"get permission from her .\" Benefits reviewed.  GBS obtained today    2. Anemia during pregnancy in third trimester  Hgb 10.8  Taking iron once daily    RTC 1 week.    Shannan Núñez, DO  "

## 2018-08-11 LAB
GP B STREP DNA SPEC QL NAA+PROBE: NEGATIVE
SPECIMEN SOURCE: NORMAL

## 2018-08-20 ENCOUNTER — PRENATAL OFFICE VISIT (OUTPATIENT)
Dept: OBGYN | Facility: CLINIC | Age: 28
End: 2018-08-20
Payer: COMMERCIAL

## 2018-08-20 VITALS — DIASTOLIC BLOOD PRESSURE: 60 MMHG | WEIGHT: 178 LBS | SYSTOLIC BLOOD PRESSURE: 100 MMHG | BODY MASS INDEX: 28.73 KG/M2

## 2018-08-20 DIAGNOSIS — O99.013 ANEMIA DURING PREGNANCY IN THIRD TRIMESTER: ICD-10-CM

## 2018-08-20 DIAGNOSIS — Z34.83 ENCOUNTER FOR SUPERVISION OF OTHER NORMAL PREGNANCY IN THIRD TRIMESTER: Primary | ICD-10-CM

## 2018-08-20 PROCEDURE — 99207 ZZC PRENATAL VISIT: CPT | Performed by: OBSTETRICS & GYNECOLOGY

## 2018-08-20 NOTE — PROGRESS NOTES
27 year old  at 37w1d weeks presents to the clinic for a routine prenatal visit.  Feeling well.  No vaginal bleeding, leakage of fluid, or regular contractions. + [place] Monreal. +FM.        1. Encounter for supervision of other normal pregnancy in third trimester  B+ / RI  Low lying placenta resolved.  Declined Tdap.  GBS negative  Late for visit today. Discuss PP contraception next visit.     2. Anemia during pregnancy in third trimester  Hgb 10.8  Taking iron once daily    RTC 1 week.    Shannan Núñez, DO

## 2018-08-20 NOTE — MR AVS SNAPSHOT
After Visit Summary   8/20/2018    Thanh Douglas    MRN: [id number]           Patient Information     Date Of Birth          1990        Visit Information        Provider Department      8/20/2018 3:00 PM Shannan Núñez DO; [employer] [facility]        Today's Diagnoses     Encounter for supervision of other normal pregnancy in third trimester    -  1    Anemia during pregnancy in third trimester           Follow-ups after your visit        Follow-up notes from your care team     Return in about 1 week (around 8/27/2018).      Your next 10 appointments already scheduled     Aug 28, 2018  1:45 PM CDT   ESTABLISHED PRENATAL with Laurel Diggs MD   [facility] ([facility])    [address] [place]  [address]  [facility] [address] [phone]            Sep 06, 2018  3:30 PM CDT   ESTABLISHED PRENATAL with Laurel Diggs MD   [facility] ([facility])    [address]  [address]  [facility] [address] [phone]              Who to contact     If you have questions or need follow up information about today's clinic visit or your schedule please contact [facility] directly at [phone].  Normal or non-critical lab and imaging results will be communicated to you by MyChart, letter or phone within 4 business days after the clinic has received the results. If you do not hear from us within 7 days, please contact the clinic through MyChart or phone. If you have a critical or abnormal lab result, we will notify you by phone as soon as possible.  Submit refill requests through SecureNett or call your pharmacy and they will forward the refill request to us. Please allow 3 business days for your refill to be completed.          Additional Information About Your Visit        Care EveryWhere ID     This is your Care EveryWhere ID. This could be used by other organizations  to access your [place] medical records  [id number]        Your Vitals Were     Last Period BMI (Body Mass Index)                12/13/2017 28.73 kg/m2           Blood Pressure from Last 3 Encounters:   08/20/18 100/60   08/10/18 114/70   07/26/18 100/70    Weight from Last 3 Encounters:   08/20/18 178 lb (80.7 kg)   08/10/18 178 lb (80.7 kg)   07/26/18 173 lb (78.5 kg)              Today, you had the following     No orders found for display       Primary Care Provider Office Phone # Fax #    [facility] [phone]       [address] [place] [address]        Equal Access to Services     TAB ORDONEZ : Amy vineso Sogertrude, waaxda luqadaha, qaybta kaalmada adeegyada, mony gamez. So [facility] [phone].    ATENCIÓN: Si habla español, tiene a awad disposición servicios gratuitos de asistencia lingüística. Llame al [phone].    We comply with applicable federal civil rights laws and [place] laws. We do not discriminate on the basis of race, color, national origin, age, disability, sex, sexual orientation, or gender identity.            Thank you!     Thank you for choosing [facility]  for your care. Our goal is always to provide you with excellent care. Hearing back from our patients is one way we can continue to improve our services. Please take a few minutes to complete the written survey that you may receive in the mail after your visit with us. Thank you!             Your Updated Medication List - Protect others around you: Learn how to safely use, store and throw away your medicines at www.disposemymeds.org.          This list is accurate as of 8/20/18  4:01 PM.  Always use your most recent med list.                   Brand Name Dispense Instructions for use Diagnosis    prenatal multivitamin plus iron 27-0.8 MG Tabs per tablet     100 tablet    Take 1 tablet by mouth daily    Encounter for supervision of other  normal pregnancy in third trimester

## 2018-08-20 NOTE — NURSING NOTE
"Chief Complaint   Patient presents with     Prenatal Care       Initial /60  Wt 178 lb (80.7 kg)  LMP 2017  BMI 28.73 kg/m2 Estimated body mass index is 28.73 kg/(m^2) as calculated from the following:    Height as of 18: 5' 6\" (1.676 m).    Weight as of this encounter: 178 lb (80.7 kg).  BP completed using cuff size: regular        The following HM Due: NONE    +fetal movement  -swelling      Sarah Andujar, CMA           "

## 2018-08-28 ENCOUNTER — PRENATAL OFFICE VISIT (OUTPATIENT)
Dept: OBGYN | Facility: CLINIC | Age: 28
End: 2018-08-28
Payer: COMMERCIAL

## 2018-08-28 VITALS — BODY MASS INDEX: 29.38 KG/M2 | DIASTOLIC BLOOD PRESSURE: 70 MMHG | WEIGHT: 182 LBS | SYSTOLIC BLOOD PRESSURE: 112 MMHG

## 2018-08-28 DIAGNOSIS — Z34.80 SUPERVISION OF OTHER NORMAL PREGNANCY, ANTEPARTUM: Primary | ICD-10-CM

## 2018-08-28 PROCEDURE — 99207 ZZC PRENATAL VISIT: CPT | Performed by: OBSTETRICS & GYNECOLOGY

## 2018-08-28 NOTE — NURSING NOTE
"Chief Complaint   Patient presents with     Prenatal Care       Initial /70  Wt 182 lb (82.6 kg)  LMP 2017  BMI 29.38 kg/m2 Estimated body mass index is 29.38 kg/(m^2) as calculated from the following:    Height as of 18: 5' 6\" (1.676 m).    Weight as of this encounter: 182 lb (82.6 kg).  BP completed using cuff size: regular        The following HM Due: NONE      +fetal movement  -bravo momin  -swelling      Sarah Andujar, CMA           "

## 2018-08-28 NOTE — MR AVS SNAPSHOT
After Visit Summary   8/28/2018    Thanh Douglas    MRN: [id number]           Patient Information     Date Of Birth          1990        Visit Information        Provider Department      8/28/2018 1:45 PM Laurel Diggs MD; [employer] [facility]        Today's Diagnoses     Supervision of other normal pregnancy, antepartum    -  1       Follow-ups after your visit        Your next 10 appointments already scheduled     Sep 06, 2018  3:30 PM CDT   ESTABLISHED PRENATAL with Laurel Diggs MD   [facility]    [address]  [address]  [facility] [id number] [phone]              Who to contact     If you have questions or need follow up information about today's clinic visit or your schedule please contact [facility] directly at [phone].  Normal or non-critical lab and imaging results will be communicated to you by MyChart, letter or phone within 4 business days after the clinic has received the results. If you do not hear from us within 7 days, please contact the clinic through MyChart or phone. If you have a critical or abnormal lab result, we will notify you by phone as soon as possible.  Submit refill requests through FastBooking or call your pharmacy and they will forward the refill request to us. Please allow 3 business days for your refill to be completed.          Additional Information About Your Visit        Care EveryWhere ID     This is your Care EveryWhere ID. This could be used by other organizations to access your [place] medical records  [id number]        Your Vitals Were     Last Period BMI (Body Mass Index)                12/13/2017 29.38 kg/m2           Blood Pressure from Last 3 Encounters:   08/28/18 112/70   08/20/18 100/60   08/10/18 114/70    Weight from Last 3 Encounters:   08/28/18 182 lb (82.6 kg)   08/20/18 178 lb (80.7 kg)   08/10/18 178 lb (80.7 kg)               Today, you had the following     No orders found for display       Primary Care Provider Office Phone # Fax #    [facility] [phone]       [address] [place] [address] [address] [facility] [address]        Equal Access to Services     TAB ORDONEZ : Hadii aad ku hadpilaro Soradhaali, waaxda luqadaha, qaybta kaalmada adeegyada, waxmeenakshi idiin evertonn maira chang sanaz gamez. So [place] [phone].    ATENCIÓN: Si habla español, tiene a awad disposición servicios gratuitos de asistencia lingüística. Llame al [phone].    We comply with applicable federal civil rights laws and [place] laws. We do not discriminate on the basis of race, color, national origin, age, disability, sex, sexual orientation, or gender identity.            Thank you!     Thank you for choosing [facility]  for your care. Our goal is always to provide you with excellent care. Hearing back from our patients is one way we can continue to improve our services. Please take a few minutes to complete the written survey that you may receive in the mail after your visit with us. Thank you!             Your Updated Medication List - Protect others around you: Learn how to safely use, store and throw away your medicines at www.disposemymeds.org.          This list is accurate as of 8/28/18  2:37 PM.  Always use your most recent med list.                   Brand Name Dispense Instructions for use Diagnosis    prenatal multivitamin plus iron 27-0.8 MG Tabs per tablet     100 tablet    Take 1 tablet by mouth daily    Encounter for supervision of other normal pregnancy in third trimester

## 2018-08-28 NOTE — PROGRESS NOTES
27 year old  at 38w2d weeks presents to the clinic for a routine prenatal visit.  Feeling well.  No vaginal bleeding, leakage of fluid, or regular contractions. + [place] Monreal. +FM.      1. Encounter for supervision of other normal pregnancy in third trimester  B+ / RI  Low lying placenta resolved.  Declined Tdap.  GBS negative  PP contraception reviewed, brochure provided. Patient desires to discuss with her . Revisit next week.    2. Anemia during pregnancy in third trimester  Hgb 10.8  Taking iron once daily    RTC 1 week.    Laurel Diggs MD

## 2018-09-06 ENCOUNTER — PRENATAL OFFICE VISIT (OUTPATIENT)
Dept: OBGYN | Facility: CLINIC | Age: 28
End: 2018-09-06
Payer: COMMERCIAL

## 2018-09-06 VITALS — DIASTOLIC BLOOD PRESSURE: 58 MMHG | BODY MASS INDEX: 30.02 KG/M2 | WEIGHT: 186 LBS | SYSTOLIC BLOOD PRESSURE: 112 MMHG

## 2018-09-06 DIAGNOSIS — Z34.80 SUPERVISION OF OTHER NORMAL PREGNANCY, ANTEPARTUM: Primary | ICD-10-CM

## 2018-09-06 PROCEDURE — 99207 ZZC PRENATAL VISIT: CPT | Performed by: OBSTETRICS & GYNECOLOGY

## 2018-09-06 NOTE — MR AVS SNAPSHOT
After Visit Summary   9/6/2018    Thanh Douglas    MRN: [id number]           Patient Information     Date Of Birth          1990        Visit Information        Provider Department      9/6/2018 3:15 PM Laurel Diggs MD; [employer] [facility]        Today's Diagnoses     Supervision of other normal pregnancy, antepartum    -  1       Follow-ups after your visit        Your next 10 appointments already scheduled     Sep 12, 2018  1:45 PM CDT   ESTABLISHED PRENATAL with Ryan Patel MD   [facility] ([facility])    [address]  [address]  [facility] [address]   [phone]              Who to contact     If you have questions or need follow up information about today's clinic visit or your schedule please contact [facility] directly at [phone].  Normal or non-critical lab and imaging results will be communicated to you by MyChart, letter or phone within 4 business days after the clinic has received the results. If you do not hear from us within 7 days, please contact the clinic through MyChart or phone. If you have a critical or abnormal lab result, we will notify you by phone as soon as possible.  Submit refill requests through Sarenza or call your pharmacy and they will forward the refill request to us. Please allow 3 business days for your refill to be completed.          Additional Information About Your Visit        Care EveryWhere ID     This is your Care EveryWhere ID. This could be used by other organizations to access your [place] medical records  [id number]        Your Vitals Were     Last Period BMI (Body Mass Index)                12/13/2017 30.02 kg/m2           Blood Pressure from Last 3 Encounters:   09/06/18 112/58   08/28/18 112/70   08/20/18 100/60    Weight from Last 3 Encounters:   09/06/18 186 lb (84.4 kg)   08/28/18 182 lb (82.6 kg)   08/20/18 178 lb (80.7 kg)               Today, you had the following     No orders found for display       Primary Care Provider Office Phone # Fax #    [facility] [phone]       [address] [place] [address] [address] [facility] [address]        Equal Access to Services     TAB ORDONEZ : Hadii aad ku hadpilaro Soradhaali, waaxda luqadaha, qaybta kaalmada adeegyada, waxmeenakshi idiin evertonn maira chang sanaz gamez. So [facility] [phone].    ATENCIÓN: Si habla español, tiene a awad disposición servicios gratuitos de asistencia lingüística. Llame al [phone].    We comply with applicable federal civil rights laws and [place] laws. We do not discriminate on the basis of race, color, national origin, age, disability, sex, sexual orientation, or gender identity.            Thank you!     Thank you for choosing [facility]  for your care. Our goal is always to provide you with excellent care. Hearing back from our patients is one way we can continue to improve our services. Please take a few minutes to complete the written survey that you may receive in the mail after your visit with us. Thank you!             Your Updated Medication List - Protect others around you: Learn how to safely use, store and throw away your medicines at www.disposemymeds.org.          This list is accurate as of 9/6/18  5:42 PM.  Always use your most recent med list.                   Brand Name Dispense Instructions for use Diagnosis    prenatal multivitamin plus iron 27-0.8 MG Tabs per tablet     100 tablet    Take 1 tablet by mouth daily    Encounter for supervision of other normal pregnancy in third trimester

## 2018-09-06 NOTE — PROGRESS NOTES
27 year old  at 39w4d presents to the clinic for a routine prenatal visit.  Feeling well.  No vaginal bleeding, leakage of fluid, or regular contractions. rare [place] Monreal. +FM.      1. Encounter for supervision of other normal pregnancy in third trimester  B+ / RI  Low lying placenta resolved.  Declined Tdap.  GBS negative  PP contraception reviewed, brochure provided.    2. Anemia during pregnancy in third trimester  Hgb 10.8  Taking iron once daily    RTC 1 week.    Laurel Diggs MD

## 2018-09-06 NOTE — NURSING NOTE
"  Chief Complaint   Patient presents with     Prenatal Care     39 weeks 4 days. No questions or concerns, patient has no c/o vaginal bleeding. leaking of fluids. Patient feeling movement daily. Has been having some minor contractions, and would like cervix checked.       Initial /58 (BP Location: Right arm, Patient Position: Chair, Cuff Size: Adult Regular)  Wt 186 lb (84.4 kg)  LMP 12/13/2017  BMI 30.02 kg/m2 Estimated body mass index is 30.02 kg/(m^2) as calculated from the following:    Height as of 4/4/18: 5' 6\" (1.676 m).    Weight as of this encounter: 186 lb (84.4 kg).  Medication Reconciliation: complete    Ying Dalton CMA      "

## 2018-09-09 ENCOUNTER — HOSPITAL ENCOUNTER (INPATIENT)
Facility: CLINIC | Age: 28
LOS: 2 days | Discharge: HOME | End: 2018-09-11
Attending: OBSTETRICS & GYNECOLOGY | Admitting: OBSTETRICS & GYNECOLOGY
Payer: COMMERCIAL

## 2018-09-09 PROBLEM — Z36.89 ENCOUNTER FOR TRIAGE IN PREGNANT PATIENT: Status: ACTIVE | Noted: 2018-09-09

## 2018-09-09 LAB
ABO + RH BLD: NORMAL
ABO + RH BLD: NORMAL
HGB BLD-MCNC: 11.9 G/DL (ref 11.7–15.7)
SPECIMEN EXP DATE BLD: NORMAL

## 2018-09-09 PROCEDURE — 12000029 ZZH R&B OB INTERMEDIATE

## 2018-09-09 PROCEDURE — G0463 HOSPITAL OUTPT CLINIC VISIT: HCPCS

## 2018-09-09 PROCEDURE — 72200001 ZZH LABOR CARE VAGINAL DELIVERY SINGLE

## 2018-09-09 PROCEDURE — 25000128 H RX IP 250 OP 636: Performed by: OBSTETRICS & GYNECOLOGY

## 2018-09-09 PROCEDURE — 25000132 ZZH RX MED GY IP 250 OP 250 PS 637: Performed by: OBSTETRICS & GYNECOLOGY

## 2018-09-09 PROCEDURE — 25000125 ZZHC RX 250: Performed by: OBSTETRICS & GYNECOLOGY

## 2018-09-09 PROCEDURE — 86900 BLOOD TYPING SEROLOGIC ABO: CPT | Performed by: OBSTETRICS & GYNECOLOGY

## 2018-09-09 PROCEDURE — 85018 HEMOGLOBIN: CPT | Performed by: OBSTETRICS & GYNECOLOGY

## 2018-09-09 PROCEDURE — 59400 OBSTETRICAL CARE: CPT | Performed by: OBSTETRICS & GYNECOLOGY

## 2018-09-09 PROCEDURE — 86901 BLOOD TYPING SEROLOGIC RH(D): CPT | Performed by: OBSTETRICS & GYNECOLOGY

## 2018-09-09 PROCEDURE — 86780 TREPONEMA PALLIDUM: CPT | Performed by: OBSTETRICS & GYNECOLOGY

## 2018-09-09 RX ORDER — METHYLERGONOVINE MALEATE 0.2 MG/ML
200 INJECTION INTRAVENOUS
Status: COMPLETED | OUTPATIENT
Start: 2018-09-09 | End: 2018-09-09

## 2018-09-09 RX ORDER — HYDROCORTISONE 2.5 %
CREAM (GRAM) TOPICAL 3 TIMES DAILY PRN
Status: DISCONTINUED | OUTPATIENT
Start: 2018-09-09 | End: 2018-09-11 | Stop reason: HOSPADM

## 2018-09-09 RX ORDER — NALOXONE HYDROCHLORIDE 0.4 MG/ML
.1-.4 INJECTION, SOLUTION INTRAMUSCULAR; INTRAVENOUS; SUBCUTANEOUS
Status: DISCONTINUED | OUTPATIENT
Start: 2018-09-09 | End: 2018-09-11 | Stop reason: HOSPADM

## 2018-09-09 RX ORDER — AMOXICILLIN 250 MG
2 CAPSULE ORAL 2 TIMES DAILY
Status: DISCONTINUED | OUTPATIENT
Start: 2018-09-09 | End: 2018-09-11 | Stop reason: HOSPADM

## 2018-09-09 RX ORDER — OXYTOCIN/0.9 % SODIUM CHLORIDE 30/500 ML
340 PLASTIC BAG, INJECTION (ML) INTRAVENOUS CONTINUOUS PRN
Status: DISCONTINUED | OUTPATIENT
Start: 2018-09-09 | End: 2018-09-11 | Stop reason: HOSPADM

## 2018-09-09 RX ORDER — OXYTOCIN/0.9 % SODIUM CHLORIDE 30/500 ML
1-24 PLASTIC BAG, INJECTION (ML) INTRAVENOUS CONTINUOUS
Status: DISCONTINUED | OUTPATIENT
Start: 2018-09-09 | End: 2018-09-09

## 2018-09-09 RX ORDER — AMOXICILLIN 250 MG
1 CAPSULE ORAL 2 TIMES DAILY
Status: DISCONTINUED | OUTPATIENT
Start: 2018-09-09 | End: 2018-09-11 | Stop reason: HOSPADM

## 2018-09-09 RX ORDER — CARBOPROST TROMETHAMINE 250 UG/ML
250 INJECTION, SOLUTION INTRAMUSCULAR
Status: DISCONTINUED | OUTPATIENT
Start: 2018-09-09 | End: 2018-09-09

## 2018-09-09 RX ORDER — OXYTOCIN 10 [USP'U]/ML
10 INJECTION, SOLUTION INTRAMUSCULAR; INTRAVENOUS
Status: DISCONTINUED | OUTPATIENT
Start: 2018-09-09 | End: 2018-09-11 | Stop reason: HOSPADM

## 2018-09-09 RX ORDER — NALOXONE HYDROCHLORIDE 0.4 MG/ML
.1-.4 INJECTION, SOLUTION INTRAMUSCULAR; INTRAVENOUS; SUBCUTANEOUS
Status: DISCONTINUED | OUTPATIENT
Start: 2018-09-09 | End: 2018-09-09

## 2018-09-09 RX ORDER — OXYTOCIN/0.9 % SODIUM CHLORIDE 30/500 ML
100 PLASTIC BAG, INJECTION (ML) INTRAVENOUS CONTINUOUS
Status: DISCONTINUED | OUTPATIENT
Start: 2018-09-09 | End: 2018-09-11 | Stop reason: HOSPADM

## 2018-09-09 RX ORDER — LIDOCAINE 40 MG/G
CREAM TOPICAL
Status: DISCONTINUED | OUTPATIENT
Start: 2018-09-09 | End: 2018-09-09

## 2018-09-09 RX ORDER — OXYTOCIN 10 [USP'U]/ML
10 INJECTION, SOLUTION INTRAMUSCULAR; INTRAVENOUS
Status: DISCONTINUED | OUTPATIENT
Start: 2018-09-09 | End: 2018-09-09

## 2018-09-09 RX ORDER — IBUPROFEN 800 MG/1
800 TABLET, FILM COATED ORAL EVERY 6 HOURS PRN
Status: DISCONTINUED | OUTPATIENT
Start: 2018-09-09 | End: 2018-09-11 | Stop reason: HOSPADM

## 2018-09-09 RX ORDER — ONDANSETRON 2 MG/ML
4 INJECTION INTRAMUSCULAR; INTRAVENOUS EVERY 6 HOURS PRN
Status: DISCONTINUED | OUTPATIENT
Start: 2018-09-09 | End: 2018-09-09

## 2018-09-09 RX ORDER — BISACODYL 10 MG
10 SUPPOSITORY, RECTAL RECTAL DAILY PRN
Status: DISCONTINUED | OUTPATIENT
Start: 2018-09-11 | End: 2018-09-11 | Stop reason: HOSPADM

## 2018-09-09 RX ORDER — OXYTOCIN/0.9 % SODIUM CHLORIDE 30/500 ML
100-340 PLASTIC BAG, INJECTION (ML) INTRAVENOUS CONTINUOUS PRN
Status: COMPLETED | OUTPATIENT
Start: 2018-09-09 | End: 2018-09-09

## 2018-09-09 RX ORDER — ACETAMINOPHEN 325 MG/1
650 TABLET ORAL EVERY 4 HOURS PRN
Status: DISCONTINUED | OUTPATIENT
Start: 2018-09-09 | End: 2018-09-09

## 2018-09-09 RX ORDER — IBUPROFEN 800 MG/1
800 TABLET, FILM COATED ORAL
Status: COMPLETED | OUTPATIENT
Start: 2018-09-09 | End: 2018-09-09

## 2018-09-09 RX ORDER — OXYCODONE AND ACETAMINOPHEN 5; 325 MG/1; MG/1
1 TABLET ORAL
Status: DISCONTINUED | OUTPATIENT
Start: 2018-09-09 | End: 2018-09-09

## 2018-09-09 RX ORDER — ACETAMINOPHEN 325 MG/1
650 TABLET ORAL EVERY 4 HOURS PRN
Status: DISCONTINUED | OUTPATIENT
Start: 2018-09-09 | End: 2018-09-11 | Stop reason: HOSPADM

## 2018-09-09 RX ORDER — LANOLIN 100 %
OINTMENT (GRAM) TOPICAL
Status: DISCONTINUED | OUTPATIENT
Start: 2018-09-09 | End: 2018-09-11 | Stop reason: HOSPADM

## 2018-09-09 RX ORDER — SODIUM CHLORIDE, SODIUM LACTATE, POTASSIUM CHLORIDE, CALCIUM CHLORIDE 600; 310; 30; 20 MG/100ML; MG/100ML; MG/100ML; MG/100ML
INJECTION, SOLUTION INTRAVENOUS CONTINUOUS
Status: DISCONTINUED | OUTPATIENT
Start: 2018-09-09 | End: 2018-09-09

## 2018-09-09 RX ADMIN — IBUPROFEN 800 MG: 800 TABLET ORAL at 18:52

## 2018-09-09 RX ADMIN — SODIUM CHLORIDE, POTASSIUM CHLORIDE, SODIUM LACTATE AND CALCIUM CHLORIDE: 600; 310; 30; 20 INJECTION, SOLUTION INTRAVENOUS at 18:05

## 2018-09-09 RX ADMIN — ACETAMINOPHEN 650 MG: 325 TABLET, FILM COATED ORAL at 23:46

## 2018-09-09 RX ADMIN — OXYTOCIN-SODIUM CHLORIDE 0.9% IV SOLN 30 UNIT/500ML 1 MILLI-UNITS/MIN: 30-0.9/5 SOLUTION at 17:11

## 2018-09-09 RX ADMIN — METHYLERGONOVINE MALEATE 200 MCG: 0.2 INJECTION, SOLUTION INTRAMUSCULAR; INTRAVENOUS at 18:42

## 2018-09-09 RX ADMIN — OXYTOCIN-SODIUM CHLORIDE 0.9% IV SOLN 30 UNIT/500ML 340 ML/HR: 30-0.9/5 SOLUTION at 18:28

## 2018-09-09 RX ADMIN — SENNOSIDES AND DOCUSATE SODIUM 1 TABLET: 8.6; 5 TABLET ORAL at 21:26

## 2018-09-09 RX ADMIN — SODIUM CHLORIDE, POTASSIUM CHLORIDE, SODIUM LACTATE AND CALCIUM CHLORIDE: 600; 310; 30; 20 INJECTION, SOLUTION INTRAVENOUS at 10:40

## 2018-09-09 NOTE — PROVIDER NOTIFICATION
09/09/18 1312   Provider Notification   Provider Name/Title Dr. Patel   Method of Notification Electronic Page   Request Evaluate - Remote   Notification Reason Status Update;SVE   Dr. Patel web-paged and updated on SVE 6/80/-2, FHTs with primarily category I fetal tracing, contractions pattern, patient feeling intermittent pressure with contractions. Will plan to update as patient progresses.

## 2018-09-09 NOTE — PROVIDER NOTIFICATION
09/09/18 1802   Provider Notification   Provider Name/Title Dr. Patel   Method of Notification Phone   Request Evaluate in Person   Notification Reason Status Update;SVE   Dr. Patel paged and updated on SVE 8/90/-1, patient feeling pressure and urge to push with contractions.  MD planning to come to unit at this time.

## 2018-09-09 NOTE — H&P
No significant change in general health status based on exam of the patient, review of Nursing database and prenatal.     27 yo  at 40w admitted following PROM at 0800 today.  Clear fluid.    Pregnancy uncomplicated.  GBS neg    Cervix /-2 Vtx  FHR 140s.  Ctx Q5-7 mins, becoming strong    Admit to L&D  Monitor progress  Pitocin augmentation if no spontaneous progress.    Vu Patel MD

## 2018-09-09 NOTE — PROVIDER NOTIFICATION
09/09/18 1659   Provider Notification   Provider Name/Title Dr. Patel   Method of Notification In Department   Request Evaluate - Remote   Notification Reason Status Update   Dr. Patel at nurse station and updated on contractions every 4-7 minutes. Orders received to begin augmentation with pitocin at this time.

## 2018-09-09 NOTE — IP AVS SNAPSHOT
[facility]    [address] [facility] [address]    Phone:  [phone]    Fax:  [phone]                                       After Visit Summary   9/9/2018    Thanh Douglas    MRN: [id number]           After Visit Summary Signature Page     I have received my discharge instructions, and my questions have been answered. I have discussed any challenges I see with this plan with the nurse or doctor.    ..........................................................................................................................................  Patient/Patient Representative Signature      ..........................................................................................................................................  Patient Representative Print Name and Relationship to Patient    ..................................................               ................................................  Date                                            Time    ..........................................................................................................................................  Reviewed by Signature/Title    ...................................................              ..............................................  Date                                                            Time          22EPIC Rev 08/18

## 2018-09-09 NOTE — IP AVS SNAPSHOT
MRN:[id number]                      After Visit Summary   9/9/2018    Thanh Douglas    MRN: [id number]           Thank you!     Thank you for choosing [facility] for your care. Our goal is always to provide you with excellent care. Hearing back from our patients is one way we can continue to improve our services. Please take a few minutes to complete the written survey that you may receive in the mail after you visit. If you would like to speak to someone directly about your visit please contact Patient Relations at [phone]. Thank you!          Patient Information     Date Of Birth          1990        Designated Caregiver       Most Recent Value    Caregiver    Will someone help with your care after discharge? no      About your hospital stay     You were admitted on:  September 9, 2018 You last received care in the:  [facility] Postpartum    You were discharged on:  September 11, 2018       Who to Call     For medical emergencies, please call 911.  For non-urgent questions about your medical care, please call your primary care provider or clinic, [phone]          Attending Provider     Provider Specialty    Ryan Patel MD OB/Gyn       Primary Care Provider Office Phone # Fax #    [facility] [phone]      Your next 10 appointments already scheduled     Sep 12, 2018  1:45 PM CDT   ESTABLISHED PRENATAL with Ryan Patel MD   [facility]    [address]  [address]  [facility] [id number] [phone]              Further instructions from your care team       Follow up in 6  weeks   Call [phone] or [phone] appointment     Call and ask to be seen or talk to a doctor for the following: (You can go to the ob triage button   On answering service line) .     Increased bleeding, fever, general unwell feeling or increased pain.     Dr. Anabella Zhang,  DO    OB/GYN   [facility]    Lactation [phone]  Vaginal Delivery Discharge Instructions: Burmese  Waxqabadka:     Waydiiso qoyska iyo saaxibadaa inay ku caawiyaan markaad u baahantahay.    Waxba cheney kor saarin ama cheney nadine shannonjigaaga ilaa uu dhakhtarkaagu ansixiyo.    Si fudud u qaado dhowrka asbuuc e xiga si aad u ogalaato in jirkaagu harini kabto. Waxaad samayn kartaa howl kasta oo aad rabto ilaa meeshaas laga gaarayo.    Gaadhi cheney wadin markaad qaadanayso  kaniiniyada xanuunka ee dhkhatarku kuu qoray . waxaad gaadhi wadi kartaa haddii aad qaadanayso kaniiniyada xanuunka ee koontarka.    Wac bixiyahaaga daryeelka caafimaaad haddii aad qabtid mid ka mid ah calaamadahan harini socda:    Haddii suufka dhiigga nuuga uu ku buuxsamo 1 saac gudihiis, ama aad aragto xinjiro dhiig ah oo ka wayn kubadda golafka.     Dhiig soconaya wax kabadan 6 asbuuc.    Haddii aad qabto dheecaan farjiga ka imaanaya oo  si xun u uraya.     Qandho ah 100.4  F (38  C) am aka sii saraysa (heerkulka laga qaaday carabka hoostiiisa), oo qarqaryo leh ama aan lahayn     Jeetn nabar, majiirid daran oo aad ka dareeto qaybta hoose ee ubucda.    Xanuun sii kordya, barar, guduudasho ama dheecaan ka dhiimaya meesha la tolay ee qaliinka.    Kaadi badan oo dagdag ah oo markasta ku qabanaysa , ama gubasho aad dareento markaad kaajayso.    Guduudasho, barar, ama xanuun aad ka dareento xididada lugta.    Dhibaato kaa haysata naas nuujinta, ama guduudasho ama xanuun naaska ah.    Xanuun sii kordhaya ama aan mitul tobiasmaanayn meesha la tolay ama danqashada dilaaca.    Lalabbo ama matag.    Xabad xanuun iyo qufac ama naqaska oo isaías [facility].    Dhibaato ay la socoto ondina abrams aa walwal.     Haddii aad qabtid love guardado  oo ku saabsan inaad waxyeelayso naftaada ama ilmaha, [facility] Institution Hospitalsuzanna tristonelliot dumasiiellen.     Haddii aad qabto awad aalo ernie magdaleno noqoto kadib.    Gacmahaagga nadiifi:  Markasta dhaqa  gacahaaga mitul hor inta antoniettaan danielban jamil agagaarkiisa  iyo meesha la tolay. Kevin waxmeenakshi lucianowiniaaltheaaa inkesha mckayado infakslaron. Hdii gacmahaagu anjum jacksonamanda aad gacmaha ku tirtirto si aad u nadiifiso gacmahaaga. Cidiyahaaga nadiifi ooo jar.      Vaginal Delivery Discharge Instructions  Activity:     Ask family and friends for help when you need it.    Do not place anything in your vagina until your doctor approves.    Take it easy for the next few weeks to allow your body to recover. You may do any activities you feel up to at that point.    Do not drive while taking pain pills prescribed by your doctor. You may drive if taking over-the-counter pain pills.    Call your health care provider if you have any of these symptoms:    You soak a sanitary pad with blood within 1 hour, or you see blood clots larger than a golf ball.    Bleeding that lasts more than 6 weeks.    You have vaginal discharge that smells bad.     A fever of 100.4  F (38  C) or higher (temperature taken under your tongue), with or without chills     Severe, pain, cramping or tenderness in your lower belly area.    Increased pain, swelling, redness or fluid around your stitches.    A more frequent or urgent need to urinate (pee), or it burns when you pee.    Redness, swelling or pain around a vein in your leg.    Problems breastfeeding, or a red or painful area on your breast.    Pain that increases or does not go away from an episiotomy or perineal tear.    Nausea and vomiting.    Chest pain and cough or are gasping for air.    Problems coping with sadness, anxiety, or depression.     If you have any concerns about hurting yourself or the baby, call your doctor right away.      You have questions or concerns after you return home.    Keep your hands clean:  Always wash your hands before touching your perineal area and stitches.  This helps reduce your risk of infection.  If your hands aren t dirty, you may  "use an alcohol hand-rub to clean your hands. Keep your nails clean and short.            Pending Results     No orders found from 9/7/2018 to 9/10/2018.            Statement of Approval     Ordered          09/11/18 0954  I have reviewed and agree with all the recommendations and orders detailed in this document.  EFFECTIVE NOW     Approved and electronically signed by:  Anabella Zhang DO             Admission Information     Date & Time Provider Department Dept. Phone    9/9/2018 Ryan Patel MD [facility] [phone]      Your Vitals Were     Blood Pressure Pulse Temperature Respirations Height Weight    112/71 77 97.9  F (36.6  C) (Oral) 18 1.676 m (5' 6\") 84.4 kg (186 lb)    Last Period BMI (Body Mass Index)                12/13/2017 30.02 kg/m2          Care EveryWhere ID     This is your Care EveryWhere ID. This could be used by other organizations to access your [place] medical records  [id number]        Equal Access to Services     TAB ORDONEZ AH: Hadii antonietta ku hadasho Soomaali, waaxda luqadaha, qaybta kaalmada adeegyada, waxay xavier yo . So [facility] [phone].    ATENCIÓN: Si habla español, tiene a awad disposición servicios gratuitos de asistencia lingüística. Llame al [phone].    We comply with applicable federal civil rights laws and [place] laws. We do not discriminate on the basis of race, color, national origin, age, disability, sex, sexual orientation, or gender identity.               Review of your medicines      START taking        Dose / Directions    docusate sodium 100 MG tablet   Commonly known as:  COLACE   Used for:  Vaginal delivery        Dose:  100 mg   Take 100 mg by mouth daily   Quantity:  60 tablet   Refills:  1       ibuprofen 800 MG tablet   Commonly known as:  ADVIL/MOTRIN   Used for:  Vaginal delivery        Dose:  800 mg   Take 1 tablet (800 mg) by mouth every 6 hours as needed for other (cramping)   Quantity:  90 " tablet   Refills:  3         CONTINUE these medicines which have NOT CHANGED        Dose / Directions    IRON SUPPLEMENT PO        Dose:  325 mg   Take 325 mg by mouth daily (with breakfast)   Refills:  0       prenatal multivitamin plus iron 27-0.8 MG Tabs per tablet   Used for:  Encounter for supervision of other normal pregnancy in third trimester        Dose:  1 tablet   Take 1 tablet by mouth daily   Quantity:  100 tablet   Refills:  3            Where to get your medicines      These medications were sent to [place], [place] - [address] [address] [facility]  [address] [facility] [address]     Phone:  [phone]     docusate sodium 100 MG tablet    ibuprofen 800 MG tablet                Protect others around you: Learn how to safely use, store and throw away your medicines at www.disposemymeds.org.             Medication List: This is a list of all your medications and when to take them. Check marks below indicate your daily home schedule. Keep this list as a reference.      Medications           Morning Afternoon Evening Bedtime As Needed    docusate sodium 100 MG tablet   Commonly known as:  COLACE   Take 100 mg by mouth daily                                ibuprofen 800 MG tablet   Commonly known as:  ADVIL/MOTRIN   Take 1 tablet (800 mg) by mouth every 6 hours as needed for other (cramping)   Last time this was given:  800 mg on 9/11/2018 12:56 AM                                IRON SUPPLEMENT PO   Take 325 mg by mouth daily (with breakfast)                                prenatal multivitamin plus iron 27-0.8 MG Tabs per tablet   Take 1 tablet by mouth daily

## 2018-09-09 NOTE — PROVIDER NOTIFICATION
09/09/18 1809   Provider Notification   Provider Name/Title Dr. Patel   Method of Notification At Bedside   Request Attend Delivery   Dr. Patel at bedside for delivery.

## 2018-09-09 NOTE — PROVIDER NOTIFICATION
09/09/18 0957   Provider Notification   Provider Name/Title Dr. Patel   Method of Notification In Department   Request Evaluate - Remote   Notification Reason Patient Arrived;Membrane Status;Uterine Activity;Status Update;SVE   Dr. Patel at nurse station and updated on patient arrival, SVE 4/70/-2, grossly ruptured with clear fluid observed, FHTs, contraction pattern, GBS negative, pain control option open.  Intrapartum orders received.

## 2018-09-09 NOTE — PROVIDER NOTIFICATION
09/09/18 1523   Provider Notification   Provider Name/Title Dr. Patel   Method of Notification At Bedside   Request Evaluate in Person   Dr. Patel at bedside to evaluate for possible forebag.  Attempt at AROM of forebag, no additional fluid noted at this time.  Plan to continue to monitor, no new orders.

## 2018-09-10 LAB — T PALLIDUM AB SER QL: NONREACTIVE

## 2018-09-10 PROCEDURE — 12000027 ZZH R&B OB

## 2018-09-10 PROCEDURE — 25000132 ZZH RX MED GY IP 250 OP 250 PS 637: Performed by: OBSTETRICS & GYNECOLOGY

## 2018-09-10 RX ADMIN — SENNOSIDES AND DOCUSATE SODIUM 1 TABLET: 8.6; 5 TABLET ORAL at 20:44

## 2018-09-10 RX ADMIN — IBUPROFEN 800 MG: 800 TABLET ORAL at 03:58

## 2018-09-10 RX ADMIN — SENNOSIDES AND DOCUSATE SODIUM 1 TABLET: 8.6; 5 TABLET ORAL at 09:15

## 2018-09-10 RX ADMIN — IBUPROFEN 800 MG: 800 TABLET ORAL at 10:39

## 2018-09-10 RX ADMIN — ACETAMINOPHEN 650 MG: 325 TABLET, FILM COATED ORAL at 08:45

## 2018-09-10 NOTE — L&D DELIVERY NOTE
Thanh Douglas is a 28 year old-year-old Japanese female,  2 para 1 with  EDC 18, who was admitted for PROM at 40 weeks gestation.    Her prenatal care was at the [facility] in  [place]. Prenatal course was uncomplicated. Vaginal Group B Streptococcus culture was negative.  Patient experienced spontaneous rupture of membranes at 0800, yielding clear fluid. ROM was confirmed on L&D.  Her estimated fetal weight was 6.5 lbs  Oxytocin augmentation was initiated per standard protocol for inadequate labor.  Patient declined labor analgesia.  The patient achieved complete dilation at 1811. She went on to deliver a  female infant at 182 by .  Apgars are pending.   Baby did not require rescucitation  The fetal oropharynx was bulb suctioned on the perineum.  There was no nuchal cord. The placenta delivered spontaneously and intact at 182.  The patient had a small 1st degree midline laceration. This did not require repair  QBL for the delivery was 450cc.    Dr. Vu Patel

## 2018-09-10 NOTE — PLAN OF CARE
Problem: Postpartum (Vaginal Delivery) (Adult,Obstetrics,Pediatric)  Goal: Signs and Symptoms of Listed Potential Problems Will be Absent, Minimized or Managed (Postpartum)  Signs and symptoms of listed potential problems will be absent, minimized or managed by discharge/transition of care (reference Postpartum (Vaginal Delivery) (Adult,Obstetrics,Pediatric) CPG).   Pt up to bathroom. Steady gait. Able to void. Assist with jeanie care. Independently ambulated to wheelchair.

## 2018-09-10 NOTE — PLAN OF CARE
Problem: Postpartum (Vaginal Delivery) (Adult,Obstetrics,Pediatric)  Goal: Signs and Symptoms of Listed Potential Problems Will be Absent, Minimized or Managed (Postpartum)  Signs and symptoms of listed potential problems will be absent, minimized or managed by discharge/transition of care (reference Postpartum (Vaginal Delivery) (Adult,Obstetrics,Pediatric) CPG).   Data: Thanh Douglas transferred to 425 via wheelchair at 2030. Baby transferred via parent's arms.  Action: Receiving unit notified of transfer: Yes. Patient and family notified of room change. Report given to Idalmis PARKER at 2040. Belongings sent to receiving unit. Accompanied by Registered Nurse. Oriented patient to surroundings. Call light within reach. ID bands double-checked with receiving RN.  Response: Patient tolerated transfer and is stable.

## 2018-09-10 NOTE — PROGRESS NOTES
[facility] Obstetrics Post-Partum Progress Note          Assessment and Plan:    Assessment:   Post-partum day #1  Normal spontaneous vaginal delivery  L&D complications: None      Doing well.  Normal healing wound.  No excessive bleeding  Pain well-controlled.      Plan:   Ambulation encouraged  Breast feeding strategies discussed  Pain control measures as needed  Reportable signs and symptoms dicussed with the patient  Anticipate discharge tomorrow           Interval History:   Doing well.  Pain is well-controlled.  No fevers.  No history of foul-smelling vaginal discharge.  Good appetite.  Denies chest pain, shortness of breath, nausea or vomiting.  Vaginal bleeding is similar to a heavy menstrual flow.  Ambulatory.  Breastfeeding well.          Significant Problems:    History reviewed. No pertinent past medical history.          Review of Systems:    The patient denies any chest pain, shortness of breath, excessive pain, fever, chills, purulent drainage from the wound, nausea or vomiting.          Medications:     All medications related to the patient's surgery have been reviewed  Current Facility-Administered Medications   Medication     acetaminophen (TYLENOL) tablet 650 mg     [START ON 9/11/2018] bisacodyl (DULCOLAX) Suppository 10 mg     hydrocortisone 2.5 % cream     ibuprofen (ADVIL/MOTRIN) tablet 800 mg     lactated ringers BOLUS 1,000 mL     lanolin ointment     naloxone (NARCAN) injection 0.1-0.4 mg     No MMR Needed - Assessment: Patient does not need MMR vaccine     NO Rho (D) immune globulin (RhoGam) needed - mother Rh POSITIVE     oxytocin (PITOCIN) 30 units in 500 mL 0.9% NaCl infusion     oxytocin (PITOCIN) 30 units in 500 mL 0.9% NaCl infusion     oxytocin (PITOCIN) injection 10 Units     senna-docusate (SENOKOT-S;PERICOLACE) 8.6-50 MG per tablet 1 tablet    Or     senna-docusate (SENOKOT-S;PERICOLACE) 8.6-50 MG per tablet 2 tablet     [START ON 9/11/2018] sodium phosphate (FLEET ENEMA)  1 enema     Tdap (tetanus-diphtheria-acell pertussis) (ADACEL) injection 0.5 mL     tranexamic acid (CYKLOKAPRON) infusion 1 g             Physical Exam:   Vitals were reviewed  All vitals stable  Temp: 98.3  F (36.8  C) Temp src: Oral BP: 108/68 Pulse: 74   Resp: 16        Uterine fundus is firm, non-tender and at the level of the umbilicus          Data:     All laboratory data related to this surgery reviewed  Hemoglobin   Date Value Ref Range Status   09/09/2018 11.9 11.7 - 15.7 g/dL Final   06/25/2018 10.8 (L) 11.7 - 15.7 g/dL Final   03/14/2018 11.9 11.7 - 15.7 g/dL Final   02/21/2017 10.3 (L) 11.7 - 15.7 g/dL Final   11/22/2016 10.4 (L) 11.7 - 15.7 g/dL Final     No imaging studies have been ordered    Max Alex MD

## 2018-09-10 NOTE — PLAN OF CARE
Problem: Patient Care Overview  Goal: Plan of Care/Patient Progress Review  Outcome: Improving  PP checks WNL. ind with self and baby cares, breast and bottle feeding, RN encouraged to call for help as needed. Managing pain with ibuprofen, tylenol, and ice.

## 2018-09-10 NOTE — PLAN OF CARE
Problem: Patient Care Overview  Goal: Plan of Care/Patient Progress Review  Outcome: Improving  Meeting goals for shift, see flow sheet. Caring for self and infant in room and bonding well. Comfortable. Ambulation and fluids encouraged.  her this am, no questions. Anticipate discharge to home tomorrow.

## 2018-09-10 NOTE — PLAN OF CARE
Problem: Postpartum (Vaginal Delivery) (Adult,Obstetrics,Pediatric)  Goal: Signs and Symptoms of Listed Potential Problems Will be Absent, Minimized or Managed (Postpartum)  Signs and symptoms of listed potential problems will be absent, minimized or managed by discharge/transition of care (reference Postpartum (Vaginal Delivery) (Adult,Obstetrics,Pediatric) CPG).   Outcome: No Change  To room 425 per wheelchair from L&D at 2040. oriented to room and surroundings. Has breast fed successfully prior to transfer and has voided. Denies pain at this time. FOB present and supportive, participating in baby cares.

## 2018-09-10 NOTE — LACTATION NOTE
This note was copied from a baby's chart.  LC to see patient with  present.  She states that her baby has been latching well on both sides.  She has no concerns about breastfeeding.  LC encouraged exclusive breastfeeding.  She has given formula supplements and baby has been spitty.  LC reviewed keeping formula volumes low and if baby is spitty, should not continue supplement or decrease volume.  No other concerns.

## 2018-09-11 VITALS
DIASTOLIC BLOOD PRESSURE: 71 MMHG | HEART RATE: 77 BPM | SYSTOLIC BLOOD PRESSURE: 112 MMHG | HEIGHT: 66 IN | WEIGHT: 186 LBS | TEMPERATURE: 97.9 F | RESPIRATION RATE: 18 BRPM | BODY MASS INDEX: 29.89 KG/M2

## 2018-09-11 PROCEDURE — 25000128 H RX IP 250 OP 636: Performed by: OBSTETRICS & GYNECOLOGY

## 2018-09-11 PROCEDURE — 90715 TDAP VACCINE 7 YRS/> IM: CPT | Performed by: OBSTETRICS & GYNECOLOGY

## 2018-09-11 PROCEDURE — 25000132 ZZH RX MED GY IP 250 OP 250 PS 637: Performed by: OBSTETRICS & GYNECOLOGY

## 2018-09-11 RX ORDER — ASPIRIN 81 MG
100 TABLET, DELAYED RELEASE (ENTERIC COATED) ORAL DAILY
Qty: 60 TABLET | Refills: 1 | Status: SHIPPED | OUTPATIENT
Start: 2018-09-11 | End: 2022-05-09

## 2018-09-11 RX ORDER — IBUPROFEN 800 MG/1
800 TABLET, FILM COATED ORAL EVERY 6 HOURS PRN
Qty: 90 TABLET | Refills: 3 | Status: SHIPPED | OUTPATIENT
Start: 2018-09-11 | End: 2022-05-09

## 2018-09-11 RX ADMIN — SENNOSIDES AND DOCUSATE SODIUM 2 TABLET: 8.6; 5 TABLET ORAL at 10:00

## 2018-09-11 RX ADMIN — IBUPROFEN 800 MG: 800 TABLET ORAL at 00:56

## 2018-09-11 RX ADMIN — CLOSTRIDIUM TETANI TOXOID ANTIGEN (FORMALDEHYDE INACTIVATED), CORYNEBACTERIUM DIPHTHERIAE TOXOID ANTIGEN (FORMALDEHYDE INACTIVATED), BORDETELLA PERTUSSIS TOXOID ANTIGEN (GLUTARALDEHYDE INACTIVATED), BORDETELLA PERTUSSIS FILAMENTOUS HEMAGGLUTININ ANTIGEN (FORMALDEHYDE INACTIVATED), BORDETELLA PERTUSSIS PERTACTIN ANTIGEN, AND BORDETELLA PERTUSSIS FIMBRIAE 2/3 ANTIGEN 0.5 ML: 5; 2; 2.5; 5; 3; 5 INJECTION, SUSPENSION INTRAMUSCULAR at 10:00

## 2018-09-11 RX ADMIN — ACETAMINOPHEN 650 MG: 325 TABLET, FILM COATED ORAL at 06:22

## 2018-09-11 ASSESSMENT — ACTIVITIES OF DAILY LIVING (ADL)
RETIRED_COMMUNICATION: 0-->UNDERSTANDS/COMMUNICATES WITHOUT DIFFICULTY
TRANSFERRING: 0-->INDEPENDENT
SWALLOWING: 0-->SWALLOWS FOODS/LIQUIDS WITHOUT DIFFICULTY
TOILETING: 0-->INDEPENDENT
RETIRED_EATING: 0-->INDEPENDENT
DRESS: 0-->INDEPENDENT
AMBULATION: 0-->INDEPENDENT
COGNITION: 0 - NO COGNITION ISSUES REPORTED
FALL_HISTORY_WITHIN_LAST_SIX_MONTHS: NO
BATHING: 0-->INDEPENDENT

## 2018-09-11 NOTE — PROGRESS NOTES
[facility]   Post-Partum Progress Note          Assessment and Plan:    Assessment:   Post-partum day #2  Normal spontaneous vaginal delivery  L&D complications: None      Doing well.  Pain well-controlled.      Plan:   Ambulation encouraged  Discharge later today           Interval History:   Doing well.  Pain is well-controlled.  No fevers.  No history of foul-smelling vaginal discharge.  Good appetite.  Denies chest pain, shortness of breath, nausea or vomiting.  Vaginal bleeding is similar to a heavy menstrual flow.  Ambulatory.  Breastfeeding well.          Significant Problems:    None          Review of Systems:    CONSTITUTIONAL: NEGATIVE for fever, chills, change in weight  INTEGUMENTARY/SKIN: NEGATIVE for worrisome rashes, moles or lesions  EYES: NEGATIVE for vision changes or irritation  ENT/MOUTH: NEGATIVE for ear, mouth and throat problems  RESP: NEGATIVE for significant cough or SOB  BREAST: NEGATIVE for masses, tenderness or discharge  CV: NEGATIVE for chest pain, palpitations or peripheral edema  GI: NEGATIVE for nausea, abdominal pain, heartburn, or change in bowel habits  : NEGATIVE for frequency, dysuria, or hematuria  MUSCULOSKELETAL: NEGATIVE for significant arthralgias or myalgia  NEURO: NEGATIVE for weakness, dizziness or paresthesias  ENDOCRINE: NEGATIVE for temperature intolerance, skin/hair changes  HEME: NEGATIVE for bleeding problems  PSYCHIATRIC: NEGATIVE for changes in mood or affect          Medications:   All medications related to the patient's surgery have been reviewed  -          Physical Exam:   All vitals stable  No data found.    Uterine fundus is firm, non-tender and at the level of the umbilicus          Data:     All laboratory data related to this surgery reviewed  Hemoglobin   Date Value Ref Range Status   09/09/2018 11.9 11.7 - 15.7 g/dL Final   06/25/2018 10.8 (L) 11.7 - 15.7 g/dL Final   03/14/2018 11.9 11.7 - 15.7 g/dL Final   02/21/2017 10.3 (L) 11.7 -  15.7 g/dL Final   11/22/2016 10.4 (L) 11.7 - 15.7 g/dL Final       Dr. Anabella Zhang, DO    OB/GYN   [facility]

## 2018-09-11 NOTE — LACTATION NOTE
This note was copied from a baby's chart.  LONNY follow up with .  She was formula feeding at the time of the visit.  She notes changes to her breasts and feels her milk is coming in.  LC reviewed symptoms of mastitis and reminded her to nurse often to avoid clogged ducts.  She has no questions and reports that her baby continues to latch well.

## 2018-09-11 NOTE — PLAN OF CARE
Problem: Patient Care Overview  Goal: Plan of Care/Patient Progress Review  Outcome: Adequate for Discharge Date Met: 09/11/18  Meeting goals for shift and discharge to home, see flow sheet. Patient denies pain, caring for self and infant in room and bonding well.  present this am with MD and also reviewed discharge instructions which are in Guyanese and english. No questions. Will review with spouse when he arrives.

## 2018-09-11 NOTE — DISCHARGE INSTRUCTIONS
Follow up in 6  weeks   Call [phone] or [phone] appointment     Call and ask to be seen or talk to a doctor for the following: (You can go to the ob triage button   On answering service line) .     Increased bleeding, fever, general unwell feeling or increased pain.     Dr. Anabella Zhang, DO    OB/GYN   [facility]    Lactation [phone]  Vaginal Delivery Discharge Instructions: [place]  [place]:     Waydiiso qoyska iyo saaxibadaa inay ku caawiyaan markaad u baahantahay.    Waxba cheney kor saarin ama cheney galin farjigaaga ilaa uu dhakhtarkaagu ansixiyo.    Si fudud u qaado dhowrka asbuuc e xiga si aad u ogalaato in [facility]iB[facility] Hospitalu harini kabto. Waxaad samayn kartaa howl kasta oo aad rabto ilaa meeshaas laga gaarayo.    Gaadhi cheney wadin markaad qaadanayso  kaniiniyada xanuunka ee dhkhatarku kuu qoray . waxaad gaadhi wadi kartaa haddii aad qaadanayso kaniiniyada xanuunka ee koontarka.    Wac bixiyahaaga daryeelka caafimaaad haddii aad qabtid mid ka mid ah calaamadahan harini socda:    Haddii suufka dhiigga nuuga uu ku buuxsamo 1 saac gudihiis, ama aad aragto xinjiro dhiig ah oo ka wayn kubadda golafka.     Dhiig soconaya wax kabadan 6 asbuuc.    Haddii aad qabto dheecaan farjiga ka imaanaya oo  si xun u uraya.     Qandho ah 100.4  F (38  C) am aka sii saraysa (heerkulka laga qaaday carabka hoostiiisa), oo qarqaryo leh ama aan lahayn     Xanuun, nabar, majiirid daran oo aad ka dareeto qaybta hoose ee ubucda.    Xanuujessica baird, bernieo ama dheecaan ka dhiimaya meesha la tolay ee qaliinka.    Kaadi badan oo dagdag ah oo markasta ku qabanaysa , ama gubasho aad dareento markaad kaajayso.    jessica Muse, ama xanuun aad ka dareento xididada lugta.    Dhibaato kaa haysata naas nuujinta, ama guduudasho ama xanuun naaska .    Obdulio balderasi kordhaya ama aan ka dhamaanayn meesha la tolay ama danqashada dilaaca.    Lalabbo ama matag.    Xabad obdulio iyo qufac ama naqaska oo  isaías parham.    Dhibaato ay la socoto ondina abrams, tania guardado.     Haddii aad qabtid wax geo ah oo ku saabsan inaad waxyeelayso naftaada ama ilmaha, [facility] jatinderkhtarka triston alesiaiiellen.     Haddii aad qabto awad aalo ama geo dumasaacarol guriga ku noqoto kadib.    Gacmahaagga nadiifi:  Markasta dhaqa gacahaaga ka hor inta aadan taaban farjiga agagaarkiisa  iyo meesha la tolay. Kevin waxay caawiniaysaa inuu yaraado infakshanku. Hdii gacmahaagu anjum jacksonticmahattie thomasa aalkalo aad gacmaha ku tirtirto si aad u nadiifiso gacmahaaga. Cidiyahaaga nadiifi ooo jar.      Vaginal Delivery Discharge Instructions  Activity:     Ask family and friends for help when you need it.    Do not place anything in your vagina until your doctor approves.    Take it easy for the next few weeks to allow your body to recover. You may do any activities you feel up to at that point.    Do not drive while taking pain pills prescribed by your doctor. You may drive if taking over-the-counter pain pills.    Call your health care provider if you have any of these symptoms:    You soak a sanitary pad with blood within 1 hour, or you see blood clots larger than a golf ball.    Bleeding that lasts more than 6 weeks.    You have vaginal discharge that smells bad.     A fever of 100.4  F (38  C) or higher (temperature taken under your tongue), with or without chills     Severe, pain, cramping or tenderness in your lower belly area.    Increased pain, swelling, redness or fluid around your stitches.    A more frequent or urgent need to urinate (pee), or it burns when you pee.    Redness, swelling or pain around a vein in your leg.    Problems breastfeeding, or a red or painful area on your breast.    Pain that increases or does not go away from an episiotomy or perineal tear.    Nausea and vomiting.    Chest pain and cough or are gasping for air.    Problems coping with sadness, anxiety, or depression.     If you have any concerns about  hurting yourself or the baby, call your doctor right away.      You have questions or concerns after you return home.    Keep your hands clean:  Always wash your hands before touching your perineal area and stitches.  This helps reduce your risk of infection.  If your hands aren t dirty, you may use an alcohol hand-rub to clean your hands. Keep your nails clean and short.

## 2018-09-11 NOTE — DISCHARGE SUMMARY
28 year old  y/o  s/p  ppd # 2  hemoglobin is   Hemoglobin   Date Value Ref Range Status   2018 11.9 11.7 - 15.7 g/dL Final   ]    d/c home   On colace, breast pump and ibuprofen ferrous sulfate oxycodone   Follow-up in 6 weeks for post partum examination    [facility]   Post-Partum Progress Note          Assessment and Plan:    Assessment:   Post-partum day #2  Normal spontaneous vaginal delivery  L&D complications: None      Doing well.  Pain well-controlled.      Plan:   Ambulation encouraged  Discharge later today           Interval History:   Doing well.  Pain is well-controlled.  No fevers.  No history of foul-smelling vaginal discharge.  Good appetite.  Denies chest pain, shortness of breath, nausea or vomiting.  Vaginal bleeding is similar to a heavy menstrual flow.  Ambulatory.  Breastfeeding well.          Significant Problems:    None          Review of Systems:    CONSTITUTIONAL: NEGATIVE for fever, chills, change in weight  INTEGUMENTARY/SKIN: NEGATIVE for worrisome rashes, moles or lesions  EYES: NEGATIVE for vision changes or irritation  ENT/MOUTH: NEGATIVE for ear, mouth and throat problems  RESP: NEGATIVE for significant cough or SOB  BREAST: NEGATIVE for masses, tenderness or discharge  CV: NEGATIVE for chest pain, palpitations or peripheral edema  GI: NEGATIVE for nausea, abdominal pain, heartburn, or change in bowel habits  : NEGATIVE for frequency, dysuria, or hematuria  MUSCULOSKELETAL: NEGATIVE for significant arthralgias or myalgia  NEURO: NEGATIVE for weakness, dizziness or paresthesias  ENDOCRINE: NEGATIVE for temperature intolerance, skin/hair changes  HEME: NEGATIVE for bleeding problems  PSYCHIATRIC: NEGATIVE for changes in mood or affect          Medications:   All medications related to the patient's surgery have been reviewed  -          Physical Exam:   All vitals stable  No data found.    Uterine fundus is firm, non-tender and at the level of the  umbilicus          Data:     All laboratory data related to this surgery reviewed  Hemoglobin   Date Value Ref Range Status   09/09/2018 11.9 11.7 - 15.7 g/dL Final   06/25/2018 10.8 (L) 11.7 - 15.7 g/dL Final   03/14/2018 11.9 11.7 - 15.7 g/dL Final   02/21/2017 10.3 (L) 11.7 - 15.7 g/dL Final   11/22/2016 10.4 (L) 11.7 - 15.7 g/dL Final     -    DO Dr. Anabella Collier, DO    OB/GYN   [facility] and [facility]

## 2018-09-11 NOTE — PLAN OF CARE
Problem: Patient Care Overview  Goal: Plan of Care/Patient Progress Review  Outcome: Improving  Doing well with self and infant cares, both breast and formula feeding this shift, encouraged to put baby to breast prior to offering any formula. Declines offers of pain medications this shift, denies difficulty voiding. FOB present earlier in shift, then left for work.

## 2018-09-11 NOTE — PLAN OF CARE
Problem: Patient Care Overview  Goal: Discharge Needs Assessment  Outcome: Adequate for Discharge Date Met: 09/11/18  AVS/DC teaching and medications reviewed with patient earlier, reviewed with spouse now, instructions in english and [place]. Patient is aware of when to call and follow-up. Patient is aware of resources available.  Aware of [place] scale and when to retake and call.Teaching is completed.

## 2018-09-11 NOTE — PLAN OF CARE
Problem: Patient Care Overview  Goal: Plan of Care/Patient Progress Review  Outcome: Improving  Stable postpartum patient, independent with self and infant cares. Pt is meeting expected goals and pain is well managed with Ibuprofen. Pt is attentive to infants needs and is bonding well. Continue to monitor.

## 2018-10-25 ENCOUNTER — PRENATAL OFFICE VISIT (OUTPATIENT)
Dept: OBGYN | Facility: CLINIC | Age: 28
End: 2018-10-25
Payer: COMMERCIAL

## 2018-10-25 VITALS
SYSTOLIC BLOOD PRESSURE: 106 MMHG | WEIGHT: 173 LBS | BODY MASS INDEX: 27.92 KG/M2 | HEART RATE: 76 BPM | DIASTOLIC BLOOD PRESSURE: 70 MMHG

## 2018-10-25 DIAGNOSIS — Z30.09 GENERAL COUNSELING AND ADVICE ON FEMALE CONTRACEPTION: ICD-10-CM

## 2018-10-25 DIAGNOSIS — Z34.80 SUPERVISION OF OTHER NORMAL PREGNANCY, ANTEPARTUM: ICD-10-CM

## 2018-10-25 PROCEDURE — 99213 OFFICE O/P EST LOW 20 MIN: CPT | Mod: 25 | Performed by: OBSTETRICS & GYNECOLOGY

## 2018-10-25 PROCEDURE — 99207 ZZC POST PARTUM EXAM: CPT | Performed by: OBSTETRICS & GYNECOLOGY

## 2018-10-25 ASSESSMENT — PATIENT HEALTH QUESTIONNAIRE - PHQ9: SUM OF ALL RESPONSES TO PHQ QUESTIONS 1-9: 0

## 2018-10-25 NOTE — NURSING NOTE
"Chief Complaint   Patient presents with     Post Partum Exam       Initial /70 (BP Location: Left arm, Patient Position: Chair, Cuff Size: Adult Regular)  Pulse 76  Wt 173 lb (78.5 kg)  LMP 12/13/2017  Breastfeeding? Yes  BMI 27.92 kg/m2 Estimated body mass index is 27.92 kg/(m^2) as calculated from the following:    Height as of 9/9/18: 5' 6\" (1.676 m).    Weight as of this encounter: 173 lb (78.5 kg).  Medication Reconciliation: complete     Marilin Ayoub ma      "

## 2018-10-25 NOTE — MR AVS SNAPSHOT
After Visit Summary   10/25/2018    Thanh Douglas    MRN: [id number]           Patient Information     Date Of Birth          1990        Visit Information        Provider Department      10/25/2018 9:45 AM Laurel Diggs MD; [employer] [facility]        Today's Diagnoses     Routine postpartum follow-up    -  1    Supervision of other normal pregnancy, antepartum        General counseling and advice on female contraception           Follow-ups after your visit        Your next 10 appointments already scheduled     Nov 01, 2018 11:15 AM CDT   SHORT with Laurel Diggs MD   [facility]    [address]  [address]  [facility] [address]   [phone]              Who to contact     If you have questions or need follow up information about today's clinic visit or your schedule please contact [facility] directly at [phone].  Normal or non-critical lab and imaging results will be communicated to you by MyChart, letter or phone within 4 business days after the clinic has received the results. If you do not hear from us within 7 days, please contact the clinic through MyChart or phone. If you have a critical or abnormal lab result, we will notify you by phone as soon as possible.  Submit refill requests through [employer] or call your pharmacy and they will forward the refill request to us. Please allow 3 business days for your refill to be completed.          Additional Information About Your Visit        Care EveryWhere ID     This is your Care EveryWhere ID. This could be used by other organizations to access your [facility] medical records  [id number]        Your Vitals Were     Pulse Last Period Breastfeeding? BMI (Body Mass Index)          76 12/13/2017 Yes 27.92 kg/m2         Blood Pressure from Last 3 Encounters:   10/25/18 106/70   09/11/18 112/71   09/06/18 112/58    Weight from Last  3 Encounters:   10/25/18 173 lb (78.5 kg)   09/09/18 186 lb (84.4 kg)   09/06/18 186 lb (84.4 kg)              Today, you had the following     No orders found for display       Primary Care Provider Office Phone # Fax #    [facility] [phone]       [address] [facility] [address]        Equal Access to Services     TAB ORDONEZ : Hadii aad ku hadasho Soomaali, waaxda luqadaha, qaybta kaalmada adeegyada, waxay idiin hayaan adeeg khemilycandie lamaisha gamez. So [facility] [place] [phone].    ATENCIÓN: Si garyla gail, tiene a awad disposición servicios gratuitos de asistencia lingüística. Llame al [phone].    We comply with applicable federal civil rights laws and [place] laws. We do not discriminate on the basis of race, color, national origin, age, disability, sex, sexual orientation, or gender identity.            Thank you!     Thank you for choosing [facility]  for your care. Our goal is always to provide you with excellent care. Hearing back from our patients is one way we can continue to improve our services. Please take a few minutes to complete the written survey that you may receive in the mail after your visit with us. Thank you!             Your Updated Medication List - Protect others around you: Learn how to safely use, store and throw away your medicines at www.disposemymeds.org.          This list is accurate as of 10/25/18  1:02 PM.  Always use your most recent med list.                   Brand Name Dispense Instructions for use Diagnosis    docusate sodium 100 MG tablet    COLACE    60 tablet    Take 100 mg by mouth daily    Vaginal delivery       ibuprofen 800 MG tablet    ADVIL/MOTRIN    90 tablet    Take 1 tablet (800 mg) by mouth every 6 hours as needed for other (cramping)    Vaginal delivery

## 2018-10-25 NOTE — PROGRESS NOTES
SUBJECTIVE: Thanh is here for a 6-week postpartum checkup.    Delivery date was 18. She had a  of a viable girl, weight 6 pounds 11.6 oz., with Breast fed complications.  Since delivery, she has been breast feeding.  She has no signs of infection, bleeding or other complications.  She is not pregnant.  We discussed contraceptions and she is interested in nexplanon but has many questions.  She  has not had intercourse since delivery and complains of No discomfort. Patient screened for postpartum depression and complaints are NEGATIVE. Screening has also been completed for intimate partner violence.    EXAM:  Today's Depression Rating was   PHQ-9 SCORE 10/25/2018   Total Score 0   /70 (BP Location: Left arm, Patient Position: Chair, Cuff Size: Adult Regular)  Pulse 76  Wt 173 lb (78.5 kg)  LMP 2017  Breastfeeding? Yes  BMI 27.92 kg/m2   Gen: sitting in chair in no acute distress, comfortable  Eyes: no scleral icterus, EOMI  CV: regular rate, well perfused  Pulm: no increased work of breathing, no cough  Skin: warm and dry, no rashes/lesions  Psych: mood stable, appropriate affect  Neuro: A+Ox3   : External genitalia normal well-estrogenized, healthy tissue.  No obvious excoriations, lesions, or rashes. Bartholins, urethra, normal.  Normal moist pink vaginal mucosa. Perineum intact, scant physiologic discharge.     ASSESSMENT:   Normal postpartum exam after .  Undecided plan for contraception following extensive counseling. Encouraged condoms until return visit in 1 week.     PLAN:    All methods of birth control including oral contraceptive pills, patches, vaginal ring, implant, shot, IUD (hormonal and copper), barrier methods discussed including risks, benefits, and alternatives to each, duration of use, and common side effects. She is choosing to proceed with she is interested in the nexplanon but would like to discuss further with her  and return for placement. She does plan  to have more children but larry like a break.       Return as needed or at time of next expected pap, pelvic, or breast exam. Repeat pap smear due 8/2019. In addition to her postpartum exam, 15 minutes was spent counseling on contraception options, risk, benefits, and alternatives.    Laurel Diggs MD

## 2022-05-09 ENCOUNTER — OFFICE VISIT (OUTPATIENT)
Dept: OBGYN | Facility: CLINIC | Age: 32
End: 2022-05-09

## 2022-05-09 VITALS — WEIGHT: 162 LBS | BODY MASS INDEX: 26.15 KG/M2 | DIASTOLIC BLOOD PRESSURE: 74 MMHG | SYSTOLIC BLOOD PRESSURE: 128 MMHG

## 2022-05-09 DIAGNOSIS — R30.0 DYSURIA: Primary | ICD-10-CM

## 2022-05-09 DIAGNOSIS — N89.8 VAGINAL DISCHARGE: ICD-10-CM

## 2022-05-09 LAB
ALBUMIN UR-MCNC: NEGATIVE MG/DL
APPEARANCE UR: CLEAR
BILIRUB UR QL STRIP: NEGATIVE
CLUE CELLS: ABNORMAL
COLOR UR AUTO: YELLOW
GLUCOSE UR STRIP-MCNC: NEGATIVE MG/DL
HGB UR QL STRIP: NEGATIVE
KETONES UR STRIP-MCNC: NEGATIVE MG/DL
LEUKOCYTE ESTERASE UR QL STRIP: NEGATIVE
NITRATE UR QL: NEGATIVE
PH UR STRIP: 7.5 [PH] (ref 5–7)
SP GR UR STRIP: 1.02 (ref 1–1.03)
TRICHOMONAS, WET PREP: ABNORMAL
UROBILINOGEN UR STRIP-ACNC: 0.2 E.U./DL
WBC'S/HIGH POWER FIELD, WET PREP: ABNORMAL
YEAST, WET PREP: PRESENT

## 2022-05-09 PROCEDURE — 99203 OFFICE O/P NEW LOW 30 MIN: CPT | Performed by: ADVANCED PRACTICE MIDWIFE

## 2022-05-09 PROCEDURE — 87086 URINE CULTURE/COLONY COUNT: CPT | Performed by: ADVANCED PRACTICE MIDWIFE

## 2022-05-09 PROCEDURE — 87210 SMEAR WET MOUNT SALINE/INK: CPT | Performed by: ADVANCED PRACTICE MIDWIFE

## 2022-05-09 PROCEDURE — 81003 URINALYSIS AUTO W/O SCOPE: CPT | Performed by: ADVANCED PRACTICE MIDWIFE

## 2022-05-09 PROCEDURE — 87088 URINE BACTERIA CULTURE: CPT | Performed by: ADVANCED PRACTICE MIDWIFE

## 2022-05-09 RX ORDER — PHENAZOPYRIDINE HYDROCHLORIDE 100 MG/1
200 TABLET, FILM COATED ORAL 3 TIMES DAILY PRN
Qty: 6 TABLET | Refills: 0 | Status: SHIPPED | OUTPATIENT
Start: 2022-05-09 | End: 2022-05-11

## 2022-05-09 NOTE — PROGRESS NOTES
SUBJECTIVE:                                                   Thanh Douglas is a 31 year old who presents to clinic today for the following health issue(s):  Patient presents with:  Vaginal Problem: Check for yeast infection. Reports white discharge with itching, pain with standing inside her vagina on and off, pain and burning with urination, urge to urinate but not able to urinate for the last few months.  Denies blood in the urine.       HPI:  Reports vaginal irritation x 2 weeks with white discharge and pain.  Difficulty with with urination and reports burning. Here with  as .    Patient's last menstrual period was 2022 (approximate).  Menstrual History: frequency: every 28-30 days  Patient is sexually active  .    Problem list and histories reviewed & adjusted, as indicated.  Additional history: as documented.    Patient Active Problem List   Diagnosis     Abdominal pain     Vaginal delivery     Encounter for triage in pregnant patient     Labor and delivery, indication for care     History reviewed. No pertinent surgical history.   Social History     Tobacco Use     Smoking status: Never Smoker     Smokeless tobacco: Never Used   Substance Use Topics     Alcohol use: No     Alcohol/week: 0.0 standard drinks      Problem (# of Occurrences) Relation (Name,Age of Onset)    Unknown/Adopted (2) Maternal Grandfather: , Paternal Grandfather            Current Outpatient Medications   Medication Sig     miconazole (MONISTAT 1 DAY OR NIGHT) 1200 & 2 MG & % kit Take as directed     phenazopyridine (PYRIDIUM) 100 MG tablet Take 2 tablets (200 mg) by mouth 3 times daily as needed for urinary tract discomfort     No current facility-administered medications for this visit.     No Known Allergies    ROS:  CONSTITUTIONAL: NEGATIVE for fever, chills, change in weight  INTEGUMENTARU/SKIN: NEGATIVE for worrisome rashes, moles or lesions  EYES: NEGATIVE for vision changes or irritation  ENT:  NEGATIVE for ear, mouth and throat problems  RESP: NEGATIVE for significant cough or SOB  BREAST: NEGATIVE for masses, tenderness or discharge  CV: NEGATIVE for chest pain, palpitations or peripheral edema  GI: NEGATIVE for nausea, abdominal pain, heartburn, or change in bowel habits   female: normal menses, dysuria and vaginal discharge whitish and clumpy  MUSCULOSKELETAL: NEGATIVE for significant arthralgias or myalgia  NEURO: NEGATIVE for weakness, dizziness or paresthesias  PSYCHIATRIC: NEGATIVE for changes in mood or affect    OBJECTIVE:     /74 (BP Location: Right arm, Cuff Size: Adult Regular)   Wt 73.5 kg (162 lb)   LMP 04/23/2022 (Approximate)   Breastfeeding No   BMI 26.15 kg/m    Body mass index is 26.15 kg/m .    PHYSICAL EXAM:  Constitutional:  Appearance: Well nourished, well developed alert, in no acute distress  Neurologic:  Mental Status:  Oriented X3.  Normal strength and tone, sensory exam grossly normal, mentation intact and speech normal.    Psychiatric:  Mentation appears normal and affect normal/bright.  Cervix: no lesions, white, clumpy discharge  Ovaries: nontender  Uterus: anteverted, smooth contour, without enlargement, mobile and without tenderness  Rectal:  deferred     In-Clinic Test Results:  No results found for this or any previous visit (from the past 24 hour(s)).    ASSESSMENT/PLAN:                                                        ICD-10-CM    1. Dysuria  R30.0 UA reflex to Microscopic     Urine Culture     Wet preparation     phenazopyridine (PYRIDIUM) 100 MG tablet   2. Vaginal discharge  N89.8 Wet preparation     miconazole (MONISTAT 1 DAY OR NIGHT) 1200 & 2 MG & % kit       PLAN:  Labs per above  RX as listed   Await UA results for additional treatment      DANTE Freeman CNM

## 2022-05-11 DIAGNOSIS — N30.00 ACUTE CYSTITIS WITHOUT HEMATURIA: Primary | ICD-10-CM

## 2022-05-11 LAB
BACTERIA UR CULT: ABNORMAL
BACTERIA UR CULT: ABNORMAL

## 2022-05-11 RX ORDER — CEPHALEXIN 500 MG/1
500 CAPSULE ORAL EVERY 6 HOURS
Qty: 28 CAPSULE | Refills: 0 | Status: SHIPPED | OUTPATIENT
Start: 2022-05-11 | End: 2022-05-18

## 2022-07-02 ENCOUNTER — HEALTH MAINTENANCE LETTER (OUTPATIENT)
Age: 32
End: 2022-07-02

## 2022-10-27 NOTE — PATIENT INSTRUCTIONS
Vaginitis (Vaginal Irritation/Infection)    Vaginitis is very common!  The most common vaginal infections are bacterial vaginosis or yeast. These infections are not sexually transmitted but can be incredibly uncomfortable. Seek care from your midwife if signs or symptoms arise.     Normal vaginal discharge:      Is white, clear, thick or thin (it may change depending on where you are in your cycle)    Does not have a foul odor    The amount of discharge varies    Abnormal discharge/symptoms:       Itching in and around the vagina    Redness, pain or swelling    Discharge that is foamy, greenish, curd like, or bloody    Foul smelling odor    Pain when urinating or having sex    Fever    Causes of vaginal infections:      Good bacteria from the vagina have been destroyed by bad bacteria    Reaction to something in the vagina such as a tampon or scented/perfumed soaps or bubble bath    STI's    Sensitivities to soaps/detergents/dryer sheets, lubricants, etc.    Hormonal changes    Recent use of antibiotics     Infections can also occur after you've had intercourse with a new partner or if you have had frequent intercourse         Here is a list of suggestions that may help prevent/treat vaginal infections and will help maintain a healthy vaginal environment:      1.  Boosting your immune system so you can heal faster      Make sure you are getting adequate sleep    Drink 2-3 quarts of fluids per day, Cranberries or cranberry juice (unsweetened)    Eat more nuts, grains, raw veggies, yogurt, brie, grapefruit    Decrease intake of refined sugar, red meat and alcohol    Echinacea - 3 times a day for chronic problem or every 2 hours for acute symptoms; use as directed on bottle          2.  Changing the vaginal environment to a more acid state       Soak in a warm bath tub with one cup of vinegar or lemon juice. Do not use scented soap, bubble bath, or oils.     Acidophilus capsules:  1 in your vagina at bedtime for 5-7  nights    Herbal sitz bath or jeanie-wash with:  TBSP tea tree oil or 2 TBS cider vinegar      3.  Increasing the good healthy bacteria      At each meal drink 1 tsp apple cider vinegar and 1 tsp honey in   cup warm water    Eat garlic daily, capsules or fresh.      Take probiotics 4-8 billion units/day      4.  Preventive measures      Wear cotton underwear, no thongs.  Do not wear tight clothes or pantyhose    Shower soon after working or change out of sweaty clothing     Do not wear underwear to bed.  The vaginal environment needs to breathe    Never douche or use vaginal , the vagina is self-cleaning!    Use white, unscented toilet paper.  Do not use baby wipes.  Wipe from front to back    Use only unscented tampons and pads, buy organic products if desired    Do not use perfumes/oils/lotions near your vagina or take bubble baths    Use only mild, unscented soaps around your vaginal area     Do not use fabric softeners/dryer sheets    Use gentle, unscented detergent, consider buying non-petroleum based detergents    Use only water based lubricants during sexual contact    Abstain from intercourse during times of infection    Alternative Treatment  Boric acid capsules one per vagina (not by mouth!!! Very toxic if taken orally) at bedtime for 5 days (or as suggested by your provider) may be an effective alternative treatment and also more effective for those with chronic yeast vaginitis. Boric acid is available at the pharmacy but must be purchased along with gelatin caps for insertion. It might also be available at a local compounding pharmacy. Boric acid is not safe for pregnant women. Discuss with your midwife if this treatment interests you.     If your symptoms do not resolve or if you have questions please call:     [facility]  [phone]           show

## 2023-01-07 ENCOUNTER — HEALTH MAINTENANCE LETTER (OUTPATIENT)
Age: 33
End: 2023-01-07

## 2023-02-15 ENCOUNTER — TELEPHONE (OUTPATIENT)
Dept: OBGYN | Facility: CLINIC | Age: 33
End: 2023-02-15
Payer: MEDICAID

## 2023-02-15 NOTE — TELEPHONE ENCOUNTER
Pt is requesting to transfer care to [place] from her previous clinic in [facility] due to moving here. Pt states she has been compliant with all prenatal appointments. Pt is 24 weeks or greater.  Last OV with current clinic: 1/10/23    Due for next OV : asap     3 Para 2  EDC 3/20/23, GA 35w2d    U/S done? Yes, has had 2. Normal    Previous C-sec? no    List all major health problems: none    List all complications of past deliveries (GDM, BP, etc):  none    List all current pregnancy issues:  none  1 hour glucose test: she has not had one    List current medication list : PNV  Pt records: they do not have any. They said they can try to get them    Per protocol, message routed to provider on-call for direction.      Nga MURRAY RN BSN

## 2023-02-16 ENCOUNTER — PRENATAL OFFICE VISIT (OUTPATIENT)
Dept: NURSING | Facility: CLINIC | Age: 33
End: 2023-02-16
Payer: MEDICAID

## 2023-02-16 ENCOUNTER — TRANSCRIBE ORDERS (OUTPATIENT)
Dept: MATERNAL FETAL MEDICINE | Facility: CLINIC | Age: 33
End: 2023-02-16

## 2023-02-16 DIAGNOSIS — O26.90 PREGNANCY RELATED CONDITION, ANTEPARTUM: Primary | ICD-10-CM

## 2023-02-16 DIAGNOSIS — Z34.80 PRENATAL CARE, SUBSEQUENT PREGNANCY, UNSPECIFIED TRIMESTER: Primary | ICD-10-CM

## 2023-02-16 PROCEDURE — 99207 PR NO CHARGE NURSE ONLY: CPT

## 2023-02-16 RX ORDER — PRENATAL VIT/IRON FUM/FOLIC AC 27MG-0.8MG
1 TABLET ORAL DAILY
Qty: 90 TABLET | Refills: 3 | Status: SHIPPED | OUTPATIENT
Start: 2023-02-16

## 2023-02-16 NOTE — PROGRESS NOTES
NPN nurse visit done over the phone. Pt will be given NPN folder and book at her upcoming appt.   Discussed optional screening available to assess chromosomal anomalies. Questions answered. Pt advised to call the clinic if she has any questions or concerns related to her pregnancy. Prenatal labs will be obtained at her upcoming appt. New prenatal visit scheduled on 2/20 with Dr Clayton.    35w0d    Pt just moved back here from [place].  Had limited care there. She is not having nay issues with this pregnancy. Ordered anatomy scan with M.  Pt will get labs and glucose test today.    Lab Results   Component Value Date    PAP NIL 08/10/2016           Patient supplied answers from flow sheet for:  Prenatal OB Questionnaire.  Past Medical History  Have you ever recieved care for your mental health? : No  Have you ever been in a major accident or suffered serious trauma?: No  Within the last year, has anyone hit, slapped, kicked or otherwise hurt you?: No  In the last year, has anyone forced you to have sex when you didn't want to?: No    Past Medical History 2   Have you ever received a blood transfusion?: No  Would you accept a blood transfusion if was medically recommended?: Yes  Does anyone in your home smoke?: No   Is your blood type Rh negative?: No  Have you ever ?: (!) Yes  Have you been hospitalized for a nonsurgical reason excluding normal delivery?: No  Have you ever had an abnormal pap smear?: No    Past Medical History (Continued)  Do you have a history of abnormalities of the uterus?: No  Did your mother take EUGENIO or any other hormones when she was pregnant with you?: No  Do you have any other problems we have not asked about which you feel may be important to this pregnancy?: No

## 2023-02-16 NOTE — TELEPHONE ENCOUNTER
Let's get her scheduled for a GCT right away.  She also probably needs a visit sometime in the next week for a NOB/LAURA visit and a cvx chk/GBS.  If we don't have records for her ultrasounds, let's order one right away (probably needs to  be with [facility] since she is advanced gestational age).    Thanks

## 2023-02-19 LAB
ABO/RH(D): NORMAL
ANTIBODY SCREEN: NEGATIVE
SPECIMEN EXPIRATION DATE: NORMAL

## 2023-02-20 ENCOUNTER — PRENATAL OFFICE VISIT (OUTPATIENT)
Dept: OBGYN | Facility: CLINIC | Age: 33
End: 2023-02-20
Payer: MEDICAID

## 2023-02-20 VITALS
BODY MASS INDEX: 29.25 KG/M2 | SYSTOLIC BLOOD PRESSURE: 120 MMHG | DIASTOLIC BLOOD PRESSURE: 70 MMHG | HEIGHT: 66 IN | WEIGHT: 182 LBS

## 2023-02-20 DIAGNOSIS — Z34.80 PRENATAL CARE, SUBSEQUENT PREGNANCY, UNSPECIFIED TRIMESTER: ICD-10-CM

## 2023-02-20 DIAGNOSIS — Z34.80 SUPERVISION OF OTHER NORMAL PREGNANCY, ANTEPARTUM: Primary | ICD-10-CM

## 2023-02-20 DIAGNOSIS — N89.8 VAGINAL IRRITATION: ICD-10-CM

## 2023-02-20 DIAGNOSIS — Z23 ENCOUNTER FOR IMMUNIZATION: ICD-10-CM

## 2023-02-20 DIAGNOSIS — Z36.85 ENCOUNTER FOR ANTENATAL SCREENING FOR STREPTOCOCCUS B: ICD-10-CM

## 2023-02-20 LAB
CLUE CELLS: ABNORMAL
ERYTHROCYTE [DISTWIDTH] IN BLOOD BY AUTOMATED COUNT: 15.2 % (ref 10–15)
GLUCOSE 1H P 50 G GLC PO SERPL-MCNC: 107 MG/DL (ref 70–129)
HCT VFR BLD AUTO: 29.7 % (ref 35–47)
HGB BLD-MCNC: 9.5 G/DL (ref 11.7–15.7)
MCH RBC QN AUTO: 25.1 PG (ref 26.5–33)
MCHC RBC AUTO-ENTMCNC: 32 G/DL (ref 31.5–36.5)
MCV RBC AUTO: 79 FL (ref 78–100)
PLATELET # BLD AUTO: 392 10E3/UL (ref 150–450)
RBC # BLD AUTO: 3.78 10E6/UL (ref 3.8–5.2)
TRICHOMONAS, WET PREP: ABNORMAL
WBC # BLD AUTO: 10.8 10E3/UL (ref 4–11)
WBC'S/HIGH POWER FIELD, WET PREP: ABNORMAL
YEAST, WET PREP: PRESENT

## 2023-02-20 PROCEDURE — 87653 STREP B DNA AMP PROBE: CPT | Performed by: OBSTETRICS & GYNECOLOGY

## 2023-02-20 PROCEDURE — 86900 BLOOD TYPING SEROLOGIC ABO: CPT | Performed by: OBSTETRICS & GYNECOLOGY

## 2023-02-20 PROCEDURE — 36415 COLL VENOUS BLD VENIPUNCTURE: CPT | Performed by: OBSTETRICS & GYNECOLOGY

## 2023-02-20 PROCEDURE — 87340 HEPATITIS B SURFACE AG IA: CPT | Performed by: OBSTETRICS & GYNECOLOGY

## 2023-02-20 PROCEDURE — 86850 RBC ANTIBODY SCREEN: CPT | Performed by: OBSTETRICS & GYNECOLOGY

## 2023-02-20 PROCEDURE — 86780 TREPONEMA PALLIDUM: CPT | Performed by: OBSTETRICS & GYNECOLOGY

## 2023-02-20 PROCEDURE — 87210 SMEAR WET MOUNT SALINE/INK: CPT | Performed by: OBSTETRICS & GYNECOLOGY

## 2023-02-20 PROCEDURE — 86901 BLOOD TYPING SEROLOGIC RH(D): CPT | Performed by: OBSTETRICS & GYNECOLOGY

## 2023-02-20 PROCEDURE — 85027 COMPLETE CBC AUTOMATED: CPT | Performed by: OBSTETRICS & GYNECOLOGY

## 2023-02-20 PROCEDURE — 90715 TDAP VACCINE 7 YRS/> IM: CPT | Performed by: OBSTETRICS & GYNECOLOGY

## 2023-02-20 PROCEDURE — 86762 RUBELLA ANTIBODY: CPT | Performed by: OBSTETRICS & GYNECOLOGY

## 2023-02-20 PROCEDURE — 87389 HIV-1 AG W/HIV-1&-2 AB AG IA: CPT | Performed by: OBSTETRICS & GYNECOLOGY

## 2023-02-20 PROCEDURE — 90471 IMMUNIZATION ADMIN: CPT | Performed by: OBSTETRICS & GYNECOLOGY

## 2023-02-20 PROCEDURE — 82950 GLUCOSE TEST: CPT | Performed by: OBSTETRICS & GYNECOLOGY

## 2023-02-20 PROCEDURE — 87086 URINE CULTURE/COLONY COUNT: CPT | Performed by: OBSTETRICS & GYNECOLOGY

## 2023-02-20 PROCEDURE — 99213 OFFICE O/P EST LOW 20 MIN: CPT | Mod: 25 | Performed by: OBSTETRICS & GYNECOLOGY

## 2023-02-20 PROCEDURE — 86803 HEPATITIS C AB TEST: CPT | Performed by: OBSTETRICS & GYNECOLOGY

## 2023-02-20 NOTE — PROGRESS NOTES
"Thanh is a 32 year old  at 35w4d here for new ob visit.      Presents with FOB  Stella.  This is their third pregnancy, another girl.  Had some early PNC in [facility], now are here until delivery.  No complications in this pregnancy, she says she had a normal ultrasound in [facility].    Has been dealing with recurrent yeast infections this pregnancy.  Due for labs, TDaP, GCT, and will also do GBS today.      OB History    Para Term  AB Living   3 2 2 0 0 2   SAB IAB Ectopic Multiple Live Births   0 0 0 0 2      # Outcome Date GA Lbr Satnam/2nd Weight Sex Delivery Anes PTL Lv   3 Current            2 Term 18 40w0d 05:02 / 00:13 3.05 kg (6 lb 11.6 oz) F Vag-Spont None N CARROLL      Name: CAMI WILHELM      Apgar1: 8  Apgar5: 9   1 Term 17 41w0d 02:55 / 02:17 2.89 kg (6 lb 5.9 oz) F Vag-Spont EPI N CARROLL      Name: CAMI WILHELM      Apgar1: 8  Apgar5: 9       ROS: Ten point review of systems was reviewed and negative except the above.    Past Medical History:   Diagnosis Date     No pertinent past medical history 2023     Past Surgical History:   Procedure Laterality Date     NO HISTORY OF SURGERY  2023     Patient Active Problem List    Diagnosis Date Noted     Encounter for triage in pregnant patient 2018     Priority: Medium     Labor and delivery, indication for care 2018     Priority: Medium     Vaginal delivery 2017     Priority: Medium     Abdominal pain 2017     Priority: Medium      No Known Allergies  Prenatal Vit-Fe Fumarate-FA (PRENATAL MULTIVITAMIN W/IRON) 27-0.8 MG tablet, Take 1 tablet by mouth daily    No current facility-administered medications on file prior to visit.      Physical Exam:   /70   Ht 1.676 m (5' 6\")   Wt 82.6 kg (182 lb)   LMP 2022   BMI 29.38 kg/m      Gen:  no acute distress, comfortable, smiling  HENT: No scleral injection or icterus  CV: Regular rate and rhythm, no m/g/r  Resp: Normal work of breathing, " no cough  GI: Abdomen soft, non-tender. No masses, organomegaly.  Gravid.   Skin: No suspicious lesions or rashes  Psychiatric: mentation appears normal and affect bright  : gbs and wet prep obtained  cvx posterior and soft    Doptones 150s  FH measuring 34    Lab Results   Component Value Date    ABO B 2018    RH Pos 2018       A/P 32 year old  at 35w4d here for NOB visit.  - Discussed physician coverage, tertiary support, diet, exercise, weight gain, schedule of visits, routine and indicated ultrasounds, and childbirth education.  Discussed MFM coverage and reviewed options for  testing in the first trimester.  Recommended PNV.      Concerns:  - B+/RI.  FOB Ali  - late LAURA from [place]: will get all labs today, has level 2 US scheduled for next week.    - GBS today  - TDaP and flu shot today  - GCT today  - reviewed s/s labor, plan for IOL after 41 wks if undelivered    RTC weekly until delivery    Ness Clayton MD, MPH  [facility] OB/Gyn

## 2023-02-21 ENCOUNTER — PRE VISIT (OUTPATIENT)
Dept: MATERNAL FETAL MEDICINE | Facility: CLINIC | Age: 33
End: 2023-02-21
Payer: MEDICAID

## 2023-02-21 LAB
GP B STREP DNA SPEC QL NAA+PROBE: NEGATIVE
HBV SURFACE AG SERPL QL IA: NONREACTIVE
HCV AB SERPL QL IA: NONREACTIVE
HIV 1+2 AB+HIV1 P24 AG SERPL QL IA: NONREACTIVE
RUBV IGG SERPL QL IA: 1.88 INDEX
RUBV IGG SERPL QL IA: POSITIVE
T PALLIDUM AB SER QL: NONREACTIVE

## 2023-02-22 LAB — BACTERIA UR CULT: NORMAL

## 2023-02-28 ENCOUNTER — HOSPITAL ENCOUNTER (OUTPATIENT)
Dept: ULTRASOUND IMAGING | Facility: CLINIC | Age: 33
Discharge: HOME | End: 2023-02-28
Attending: OBSTETRICS & GYNECOLOGY
Payer: MEDICAID

## 2023-02-28 ENCOUNTER — OFFICE VISIT (OUTPATIENT)
Dept: MATERNAL FETAL MEDICINE | Facility: CLINIC | Age: 33
End: 2023-02-28
Attending: OBSTETRICS & GYNECOLOGY
Payer: MEDICAID

## 2023-02-28 DIAGNOSIS — O09.33 LATE PRENATAL CARE AFFECTING PREGNANCY IN THIRD TRIMESTER: Primary | ICD-10-CM

## 2023-02-28 DIAGNOSIS — O26.90 PREGNANCY RELATED CONDITION, ANTEPARTUM: ICD-10-CM

## 2023-02-28 PROCEDURE — 76811 OB US DETAILED SNGL FETUS: CPT | Mod: 26 | Performed by: OBSTETRICS & GYNECOLOGY

## 2023-02-28 PROCEDURE — 76811 OB US DETAILED SNGL FETUS: CPT

## 2023-02-28 NOTE — NURSING NOTE
Patient reports positive fetal movement, no pain, no contractions, leaking of fluid, or bleeding.    Patient denies headache, visual changes, nausea/vomiting, epigastric pain related to preeclampsia.  SBAR given to LOREE SEGURA, see their note in Epic.

## 2023-02-28 NOTE — PROGRESS NOTES
Please see the imaging tab for details of the ultrasound performed today.    Linda Perez MD  Specialist in Maternal-Fetal Medicine

## 2023-03-01 ENCOUNTER — PRENATAL OFFICE VISIT (OUTPATIENT)
Dept: OBGYN | Facility: CLINIC | Age: 33
End: 2023-03-01
Payer: MEDICAID

## 2023-03-01 VITALS — SYSTOLIC BLOOD PRESSURE: 118 MMHG | DIASTOLIC BLOOD PRESSURE: 72 MMHG | BODY MASS INDEX: 30.36 KG/M2 | WEIGHT: 188.1 LBS

## 2023-03-01 DIAGNOSIS — Z11.3 ROUTINE SCREENING FOR STI (SEXUALLY TRANSMITTED INFECTION): ICD-10-CM

## 2023-03-01 DIAGNOSIS — Z34.80 SUPERVISION OF OTHER NORMAL PREGNANCY, ANTEPARTUM: Primary | ICD-10-CM

## 2023-03-01 PROCEDURE — 87591 N.GONORRHOEAE DNA AMP PROB: CPT | Performed by: OBSTETRICS & GYNECOLOGY

## 2023-03-01 PROCEDURE — 87491 CHLMYD TRACH DNA AMP PROBE: CPT | Performed by: OBSTETRICS & GYNECOLOGY

## 2023-03-01 PROCEDURE — 99213 OFFICE O/P EST LOW 20 MIN: CPT | Performed by: OBSTETRICS & GYNECOLOGY

## 2023-03-01 NOTE — NURSING NOTE
"Chief Complaint   Patient presents with     Prenatal Care     36 weeks 6 days, no c/o VB, LoF,cramping/contractions. Feeling FM daily. Patient would like cervix checked. No questions or concerns today       Initial /72   Wt 85.3 kg (188 lb 1.6 oz)   LMP 2022   BMI 30.36 kg/m   Estimated body mass index is 30.36 kg/m  as calculated from the following:    Height as of 23: 1.676 m (5' 6\").    Weight as of this encounter: 85.3 kg (188 lb 1.6 oz).  BP completed using cuff size: regular    Questioned patient about current smoking habits.  Pt. has never smoked.          The following HM Due: NONE      Ying Dalton CMA               "

## 2023-03-01 NOTE — PROGRESS NOTES
IUP at 36w6d,    Late entry to [place] prenatal care, transfer from [facility]. Now here through delivery.   Level II w/missing views, repeat planned in 3 weeks if not delivered.   Recurrent yeast, resolved with monistat. Return if further symptoms  Gc/chlam collected today  GBS negative.   Return weekly through delivery. Reviewed labor warning signs.    Laurel Diggs MD

## 2023-03-02 LAB
C TRACH DNA SPEC QL NAA+PROBE: NEGATIVE
N GONORRHOEA DNA SPEC QL NAA+PROBE: NEGATIVE

## 2023-03-20 ENCOUNTER — PRENATAL OFFICE VISIT (OUTPATIENT)
Dept: OBGYN | Facility: CLINIC | Age: 33
End: 2023-03-20
Payer: MEDICAID

## 2023-03-20 VITALS
DIASTOLIC BLOOD PRESSURE: 80 MMHG | HEIGHT: 66 IN | BODY MASS INDEX: 31.02 KG/M2 | SYSTOLIC BLOOD PRESSURE: 124 MMHG | WEIGHT: 193 LBS

## 2023-03-20 DIAGNOSIS — Z34.80 SUPERVISION OF OTHER NORMAL PREGNANCY, ANTEPARTUM: Primary | ICD-10-CM

## 2023-03-20 PROCEDURE — 99212 OFFICE O/P EST SF 10 MIN: CPT | Performed by: OBSTETRICS & GYNECOLOGY

## 2023-03-20 PROCEDURE — 87653 STREP B DNA AMP PROBE: CPT | Performed by: OBSTETRICS & GYNECOLOGY

## 2023-03-20 PROCEDURE — 99207 PR PRENATAL VISIT: CPT | Performed by: OBSTETRICS & GYNECOLOGY

## 2023-03-20 NOTE — PROGRESS NOTES
32 year old  at 39w4d     - B+/RI.  FOB Ali.  Normal level 2 with several missing views, has follow-up scheduled if undelivered  - late LAURA from [place]  - GBS today neg 4 wks ago, did repeat today in case she is undelivered next week  - reviewed s/s labor, plan for IOL after 41 wks if undelivered    RTC 1 wk     Ness Clayotn MD, MPH  [facility] OB/Gyn

## 2023-03-20 NOTE — NURSING NOTE
"Chief Complaint   Patient presents with     Prenatal Care     39 4/7 weeks       Initial /80 (BP Location: Right arm, Patient Position: Chair, Cuff Size: Adult Large)   Ht 1.676 m (5' 6\")   Wt 87.5 kg (193 lb)   LMP 2022   Breastfeeding No   BMI 31.15 kg/m   Estimated body mass index is 31.15 kg/m  as calculated from the following:    Height as of this encounter: 1.676 m (5' 6\").    Weight as of this encounter: 87.5 kg (193 lb).  BP completed using cuff size: large    Questioned patient about current smoking habits.  Pt. no exposure to second hand smoke.          The following HM Due: NONE    +fetal movement  -swelling    Sarah Andujar, CMA    "

## 2023-03-21 LAB — GP B STREP DNA SPEC QL NAA+PROBE: NEGATIVE

## 2023-03-26 ENCOUNTER — HOSPITAL ENCOUNTER (INPATIENT)
Facility: CLINIC | Age: 33
LOS: 2 days | Discharge: HOME HEALTH | End: 2023-03-28
Attending: OBSTETRICS & GYNECOLOGY | Admitting: OBSTETRICS & GYNECOLOGY
Payer: MEDICAID

## 2023-03-26 LAB
ABO/RH(D): NORMAL
ANTIBODY SCREEN: NEGATIVE
ERYTHROCYTE [DISTWIDTH] IN BLOOD BY AUTOMATED COUNT: 19 % (ref 10–15)
HCT VFR BLD AUTO: 35.4 % (ref 35–47)
HGB BLD-MCNC: 11.1 G/DL (ref 11.7–15.7)
MCH RBC QN AUTO: 25.4 PG (ref 26.5–33)
MCHC RBC AUTO-ENTMCNC: 31.4 G/DL (ref 31.5–36.5)
MCV RBC AUTO: 81 FL (ref 78–100)
PLATELET # BLD AUTO: 331 10E3/UL (ref 150–450)
RBC # BLD AUTO: 4.37 10E6/UL (ref 3.8–5.2)
SARS-COV-2 RNA RESP QL NAA+PROBE: NEGATIVE
SPECIMEN EXPIRATION DATE: NORMAL
WBC # BLD AUTO: 11 10E3/UL (ref 4–11)

## 2023-03-26 PROCEDURE — G0463 HOSPITAL OUTPT CLINIC VISIT: HCPCS

## 2023-03-26 PROCEDURE — 86850 RBC ANTIBODY SCREEN: CPT | Performed by: STUDENT IN AN ORGANIZED HEALTH CARE EDUCATION/TRAINING PROGRAM

## 2023-03-26 PROCEDURE — 85027 COMPLETE CBC AUTOMATED: CPT | Performed by: STUDENT IN AN ORGANIZED HEALTH CARE EDUCATION/TRAINING PROGRAM

## 2023-03-26 PROCEDURE — 36415 COLL VENOUS BLD VENIPUNCTURE: CPT | Performed by: STUDENT IN AN ORGANIZED HEALTH CARE EDUCATION/TRAINING PROGRAM

## 2023-03-26 PROCEDURE — 120N000002 HC R&B MED SURG/OB UMMC

## 2023-03-26 PROCEDURE — 59025 FETAL NON-STRESS TEST: CPT | Mod: 26 | Performed by: OBSTETRICS & GYNECOLOGY

## 2023-03-26 PROCEDURE — 86901 BLOOD TYPING SEROLOGIC RH(D): CPT | Performed by: STUDENT IN AN ORGANIZED HEALTH CARE EDUCATION/TRAINING PROGRAM

## 2023-03-26 PROCEDURE — C9803 HOPD COVID-19 SPEC COLLECT: HCPCS

## 2023-03-26 PROCEDURE — 86780 TREPONEMA PALLIDUM: CPT | Performed by: STUDENT IN AN ORGANIZED HEALTH CARE EDUCATION/TRAINING PROGRAM

## 2023-03-26 PROCEDURE — U0005 INFEC AGEN DETEC AMPLI PROBE: HCPCS | Performed by: STUDENT IN AN ORGANIZED HEALTH CARE EDUCATION/TRAINING PROGRAM

## 2023-03-26 RX ORDER — ONDANSETRON 4 MG/1
4 TABLET, ORALLY DISINTEGRATING ORAL EVERY 6 HOURS PRN
Status: DISCONTINUED | OUTPATIENT
Start: 2023-03-26 | End: 2023-03-27 | Stop reason: HOSPADM

## 2023-03-26 RX ORDER — NALOXONE HYDROCHLORIDE 0.4 MG/ML
0.4 INJECTION, SOLUTION INTRAMUSCULAR; INTRAVENOUS; SUBCUTANEOUS
Status: DISCONTINUED | OUTPATIENT
Start: 2023-03-26 | End: 2023-03-27 | Stop reason: HOSPADM

## 2023-03-26 RX ORDER — SODIUM CHLORIDE, SODIUM LACTATE, POTASSIUM CHLORIDE, CALCIUM CHLORIDE 600; 310; 30; 20 MG/100ML; MG/100ML; MG/100ML; MG/100ML
INJECTION, SOLUTION INTRAVENOUS CONTINUOUS
Status: DISCONTINUED | OUTPATIENT
Start: 2023-03-26 | End: 2023-03-27 | Stop reason: HOSPADM

## 2023-03-26 RX ORDER — TERBUTALINE SULFATE 1 MG/ML
0.25 INJECTION, SOLUTION SUBCUTANEOUS
Status: DISCONTINUED | OUTPATIENT
Start: 2023-03-26 | End: 2023-03-27 | Stop reason: HOSPADM

## 2023-03-26 RX ORDER — CARBOPROST TROMETHAMINE 250 UG/ML
250 INJECTION, SOLUTION INTRAMUSCULAR
Status: DISCONTINUED | OUTPATIENT
Start: 2023-03-26 | End: 2023-03-27 | Stop reason: HOSPADM

## 2023-03-26 RX ORDER — LIDOCAINE 40 MG/G
CREAM TOPICAL
Status: DISCONTINUED | OUTPATIENT
Start: 2023-03-26 | End: 2023-03-27 | Stop reason: HOSPADM

## 2023-03-26 RX ORDER — CITRIC ACID/SODIUM CITRATE 334-500MG
30 SOLUTION, ORAL ORAL
Status: DISCONTINUED | OUTPATIENT
Start: 2023-03-26 | End: 2023-03-27 | Stop reason: HOSPADM

## 2023-03-26 RX ORDER — KETOROLAC TROMETHAMINE 30 MG/ML
30 INJECTION, SOLUTION INTRAMUSCULAR; INTRAVENOUS
Status: DISCONTINUED | OUTPATIENT
Start: 2023-03-26 | End: 2023-03-28 | Stop reason: HOSPADM

## 2023-03-26 RX ORDER — TRANEXAMIC ACID 10 MG/ML
1 INJECTION, SOLUTION INTRAVENOUS EVERY 30 MIN PRN
Status: DISCONTINUED | OUTPATIENT
Start: 2023-03-26 | End: 2023-03-27 | Stop reason: HOSPADM

## 2023-03-26 RX ORDER — SODIUM CHLORIDE, SODIUM LACTATE, POTASSIUM CHLORIDE, CALCIUM CHLORIDE 600; 310; 30; 20 MG/100ML; MG/100ML; MG/100ML; MG/100ML
INJECTION, SOLUTION INTRAVENOUS CONTINUOUS PRN
Status: DISCONTINUED | OUTPATIENT
Start: 2023-03-26 | End: 2023-03-27 | Stop reason: HOSPADM

## 2023-03-26 RX ORDER — OXYTOCIN 10 [USP'U]/ML
10 INJECTION, SOLUTION INTRAMUSCULAR; INTRAVENOUS
Status: DISCONTINUED | OUTPATIENT
Start: 2023-03-26 | End: 2023-03-27 | Stop reason: HOSPADM

## 2023-03-26 RX ORDER — OXYTOCIN/0.9 % SODIUM CHLORIDE 30/500 ML
100-340 PLASTIC BAG, INJECTION (ML) INTRAVENOUS CONTINUOUS PRN
Status: DISCONTINUED | OUTPATIENT
Start: 2023-03-26 | End: 2023-03-28 | Stop reason: HOSPADM

## 2023-03-26 RX ORDER — MISOPROSTOL 200 UG/1
800 TABLET ORAL
Status: DISCONTINUED | OUTPATIENT
Start: 2023-03-26 | End: 2023-03-27 | Stop reason: HOSPADM

## 2023-03-26 RX ORDER — ONDANSETRON 2 MG/ML
4 INJECTION INTRAMUSCULAR; INTRAVENOUS EVERY 6 HOURS PRN
Status: DISCONTINUED | OUTPATIENT
Start: 2023-03-26 | End: 2023-03-27 | Stop reason: HOSPADM

## 2023-03-26 RX ORDER — NALOXONE HYDROCHLORIDE 0.4 MG/ML
0.2 INJECTION, SOLUTION INTRAMUSCULAR; INTRAVENOUS; SUBCUTANEOUS
Status: DISCONTINUED | OUTPATIENT
Start: 2023-03-26 | End: 2023-03-27 | Stop reason: HOSPADM

## 2023-03-26 RX ORDER — ACETAMINOPHEN 325 MG/1
650 TABLET ORAL EVERY 4 HOURS PRN
Status: DISCONTINUED | OUTPATIENT
Start: 2023-03-26 | End: 2023-03-27 | Stop reason: HOSPADM

## 2023-03-26 RX ORDER — METHYLERGONOVINE MALEATE 0.2 MG/ML
200 INJECTION INTRAVENOUS
Status: DISCONTINUED | OUTPATIENT
Start: 2023-03-26 | End: 2023-03-27 | Stop reason: HOSPADM

## 2023-03-26 RX ORDER — OXYTOCIN/0.9 % SODIUM CHLORIDE 30/500 ML
340 PLASTIC BAG, INJECTION (ML) INTRAVENOUS CONTINUOUS PRN
Status: DISCONTINUED | OUTPATIENT
Start: 2023-03-26 | End: 2023-03-27 | Stop reason: HOSPADM

## 2023-03-26 RX ORDER — IBUPROFEN 800 MG/1
800 TABLET, FILM COATED ORAL
Status: DISCONTINUED | OUTPATIENT
Start: 2023-03-26 | End: 2023-03-28 | Stop reason: HOSPADM

## 2023-03-26 RX ORDER — METOCLOPRAMIDE 10 MG/1
10 TABLET ORAL EVERY 6 HOURS PRN
Status: DISCONTINUED | OUTPATIENT
Start: 2023-03-26 | End: 2023-03-27 | Stop reason: HOSPADM

## 2023-03-26 RX ORDER — CITRIC ACID/SODIUM CITRATE 334-500MG
30 SOLUTION, ORAL ORAL ONCE
Status: DISCONTINUED | OUTPATIENT
Start: 2023-03-26 | End: 2023-03-27 | Stop reason: HOSPADM

## 2023-03-26 RX ORDER — OXYTOCIN 10 [USP'U]/ML
10 INJECTION, SOLUTION INTRAMUSCULAR; INTRAVENOUS
Status: DISCONTINUED | OUTPATIENT
Start: 2023-03-26 | End: 2023-03-28 | Stop reason: HOSPADM

## 2023-03-26 RX ORDER — FENTANYL CITRATE 50 UG/ML
100 INJECTION, SOLUTION INTRAMUSCULAR; INTRAVENOUS
Status: DISCONTINUED | OUTPATIENT
Start: 2023-03-26 | End: 2023-03-27

## 2023-03-26 RX ORDER — PROCHLORPERAZINE MALEATE 10 MG
10 TABLET ORAL EVERY 6 HOURS PRN
Status: DISCONTINUED | OUTPATIENT
Start: 2023-03-26 | End: 2023-03-27 | Stop reason: HOSPADM

## 2023-03-26 RX ORDER — METOCLOPRAMIDE HYDROCHLORIDE 5 MG/ML
10 INJECTION INTRAMUSCULAR; INTRAVENOUS EVERY 6 HOURS PRN
Status: DISCONTINUED | OUTPATIENT
Start: 2023-03-26 | End: 2023-03-27 | Stop reason: HOSPADM

## 2023-03-26 RX ORDER — PROCHLORPERAZINE 25 MG
25 SUPPOSITORY, RECTAL RECTAL EVERY 12 HOURS PRN
Status: DISCONTINUED | OUTPATIENT
Start: 2023-03-26 | End: 2023-03-27 | Stop reason: HOSPADM

## 2023-03-26 RX ORDER — OXYTOCIN/0.9 % SODIUM CHLORIDE 30/500 ML
1-24 PLASTIC BAG, INJECTION (ML) INTRAVENOUS CONTINUOUS
Status: DISCONTINUED | OUTPATIENT
Start: 2023-03-26 | End: 2023-03-27 | Stop reason: HOSPADM

## 2023-03-26 RX ORDER — MISOPROSTOL 200 UG/1
400 TABLET ORAL
Status: DISCONTINUED | OUTPATIENT
Start: 2023-03-26 | End: 2023-03-27 | Stop reason: HOSPADM

## 2023-03-26 ASSESSMENT — ACTIVITIES OF DAILY LIVING (ADL)
ADLS_ACUITY_SCORE: 37
ADLS_ACUITY_SCORE: 20

## 2023-03-26 NOTE — PROVIDER NOTIFICATION
03/26/23 1710   Provider Notification   Provider Name/Title Dr Kirk   Method of Notification At Bedside   Request Evaluate in Person   Notification Reason Patient Arrived;Membrane Status     Provider at bedside to assess rupture status. Pt appears obviously ruptured. Plan to admit pt to labor and perform SVE to make plan. BSUS performed, baby cephalic. FHR reactive. Not feeling regular contractions yet. Pt transferred to [facility].

## 2023-03-26 NOTE — PROVIDER NOTIFICATION
03/26/23 1700   Provider Notification   Provider Name/Title Dr Romero   Method of Notification Electronic Page   Request Evaluate in Person   Notification Reason Patient Arrived     Provider notified of patient arrival for SROM. Pt states water broke around 1600 today. Clear fluid. VSS, assessment WDL. Feels some mild cramping but comfortable in room. FHR monitor and toco applied. Support person at bedside.

## 2023-03-26 NOTE — H&P
Ob/Gyn History and Physical   2023  Thanh Douglas  [id number]      HPI: Thanh Douglas is a 32 year old  at 40w3d by LMP who presents for SROM.     The patient reports that her water broke at 4 PM today. She noticed a gush of clear fluid, has continued to leak fluid and has continued to saturate her underwear. Has some occasional contractions But is not really having any painful contractions. No vaginal bleeding, feeling normal fetal movement.    She denies headache, vision changes, chest pain, shortness of breath, fever, chills, nausea, vomiting or other systemic complaints.    Her pregnancy has been complicated by:  - late LAURA from [place]  - anemia    ROS: No headaches, vision changes, nausea, vomiting, fevers, chills, chest pain, SOB, abdominal pain, constipation, diarrhea, dysuria, changes in vaginal discharge or edema in extremities noted.     OBHX:   OB History    Para Term  AB Living   3 2 2 0 0 2   SAB IAB Ectopic Multiple Live Births   0 0 0 0 2      # Outcome Date GA Lbr Satnam/2nd Weight Sex Delivery Anes PTL Lv   3 Current            2 Term 18 40w0d 05:02 / 00:13 3.05 kg (6 lb 11.6 oz) F Vag-Spont None N CARROLL      Name: CAMI DOUGLAS      Apgar1: 8  Apgar5: 9   1 Term 17 41w0d 02:55 / 02:17 2.89 kg (6 lb 5.9 oz) F Vag-Spont EPI N CARROLL      Name: CAMI DOUGLAS      Apgar1: 8  Apgar5: 9       Past Medical History:   Diagnosis Date     No pertinent past medical history 2023       Past Surgical History:   Procedure Laterality Date     NO HISTORY OF SURGERY  2023       Medications:   No current facility-administered medications on file prior to encounter.  Prenatal Vit-Fe Fumarate-FA (PRENATAL MULTIVITAMIN W/IRON) 27-0.8 MG tablet, Take 1 tablet by mouth daily        No Known Allergies    Family History   Problem Relation Age of Onset     No Known Problems Mother      No Known Problems Father      Unknown/Adopted Maternal Grandfather               Unknown/Adopted Paternal Grandfather        SocialHX:   Social History     Tobacco Use     Smoking status: Never     Smokeless tobacco: Never   Vaping Use     Vaping Use: Never used   Substance Use Topics     Alcohol use: No     Alcohol/week: 0.0 standard drinks     Drug use: No       ROS: 10-point ROS negative except as indicated in HPI.    Physical Exam:  Vitals:    03/26/23 1655   BP: 114/78   Resp: 18   Temp: 98.2  F (36.8  C)   TempSrc: Oral     General: alert, oriented female, resting in bed in NAD  CV: regular rate and rhythm,  Lungs: clear bilaterally, no crackles or wheezes.   Abdomen: soft, gravid, non-tender, EFW 7.5.  Extremities: bilateral lower extremities non-tender with no edema    SVE: 2-3/60/-3  Membranes: Grossly ruptured    Presentation: Cephalic by BSUS    FHT: baseline 140, moderate variability, + accelerations, no decelerations  [place]: no contrations    Prenatal Labs:   Lab Results   Component Value Date    ABO B 09/09/2018    RH Pos 09/09/2018    AS Negative 02/20/2023    HEPBANG Nonreactive 02/20/2023    CHPCRT Negative 03/01/2023    GCPCRT Negative 03/01/2023    TREPAB Negative 03/14/2018    HGB 11.1 (L) 03/26/2023       GBS Status:   Lab Results   Component Value Date    GBS Negative 08/10/2018       Lab Results   Component Value Date    PAP NIL 08/10/2016       Labs:   Results for orders placed or performed during the hospital encounter of 03/26/23 (from the past 24 hour(s))   ABO/Rh type and screen    Narrative    The following orders were created for panel order ABO/Rh type and screen.  Procedure                               Abnormality         Status                     ---------                               -----------         ------                     Adult Type and Screen[[id number]]                            Preliminary result           Please view results for these tests on the individual orders.   CBC with platelets   Result Value Ref Range    WBC Count 11.0 4.0 - 11.0 10e3/uL     RBC Count 4.37 3.80 - 5.20 10e6/uL    Hemoglobin 11.1 (L) 11.7 - 15.7 g/dL    Hematocrit 35.4 35.0 - 47.0 %    MCV 81 78 - 100 fL    MCH 25.4 (L) 26.5 - 33.0 pg    MCHC 31.4 (L) 31.5 - 36.5 g/dL    RDW 19.0 (H) 10.0 - 15.0 %    Platelet Count 331 150 - 450 10e3/uL   Adult Type and Screen   Result Value Ref Range    SPECIMEN EXPIRATION DATE [id number]        Assessment/Plan: 32 year old  at 40w3d who presents for ROM. Pregnancy complicated by: anemia, late LAURA from [place]    PROM  - Admit to L&D  - not fei much. Discussed augmentation with pitocin. At this time, she would like to see if she starts fei on her own. We discussed risk of infection with increased duration of ROM. She is understanding. Plan to start pitocin in a few hours if she is not fei  - GBS neg  - pain management: per patient request, discussed options. She is going to see how it goes.   - anticipate     # Fetal well-being  - Category 1 FHT  - continuous fetal monitoring  - intrauterine resuscitation PRN    Patient discussed with Dr. Jennings.    Jonny Kirk MD  OB/GYN PGY-3  2023 6:52 PM      Physician Attestation   I saw this patient with the resident and agree with the resident/fellow's findings and plan of care as documented in the note.      Key findings: NST reactive.  at 40w3d with PROM, GBS neg. Starting to feel some contractions. Discussed pitocin augmentation PRN and may desire epidural. Anticipate .    MARTHA JENNINGS MD  Date of Service (when I saw the patient): 23

## 2023-03-27 LAB — T PALLIDUM AB SER QL: NONREACTIVE

## 2023-03-27 PROCEDURE — 120N000002 HC R&B MED SURG/OB UMMC

## 2023-03-27 PROCEDURE — 722N000001 HC LABOR CARE VAGINAL DELIVERY SINGLE

## 2023-03-27 PROCEDURE — 250N000013 HC RX MED GY IP 250 OP 250 PS 637

## 2023-03-27 PROCEDURE — 250N000009 HC RX 250

## 2023-03-27 PROCEDURE — 59410 OBSTETRICAL CARE: CPT | Mod: GC | Performed by: OBSTETRICS & GYNECOLOGY

## 2023-03-27 PROCEDURE — 250N000011 HC RX IP 250 OP 636

## 2023-03-27 RX ORDER — FENTANYL CITRATE 50 UG/ML
50 INJECTION, SOLUTION INTRAMUSCULAR; INTRAVENOUS
Status: DISCONTINUED | OUTPATIENT
Start: 2023-03-27 | End: 2023-03-28 | Stop reason: HOSPADM

## 2023-03-27 RX ORDER — DOCUSATE SODIUM 100 MG/1
100 CAPSULE, LIQUID FILLED ORAL DAILY
Status: DISCONTINUED | OUTPATIENT
Start: 2023-03-27 | End: 2023-03-28 | Stop reason: HOSPADM

## 2023-03-27 RX ORDER — OXYTOCIN/0.9 % SODIUM CHLORIDE 30/500 ML
PLASTIC BAG, INJECTION (ML) INTRAVENOUS
Status: COMPLETED
Start: 2023-03-27 | End: 2023-03-27

## 2023-03-27 RX ORDER — MISOPROSTOL 200 UG/1
800 TABLET ORAL
Status: DISCONTINUED | OUTPATIENT
Start: 2023-03-27 | End: 2023-03-28 | Stop reason: HOSPADM

## 2023-03-27 RX ORDER — MISOPROSTOL 200 UG/1
400 TABLET ORAL
Status: DISCONTINUED | OUTPATIENT
Start: 2023-03-27 | End: 2023-03-28 | Stop reason: HOSPADM

## 2023-03-27 RX ORDER — NALOXONE HYDROCHLORIDE 0.4 MG/ML
0.2 INJECTION, SOLUTION INTRAMUSCULAR; INTRAVENOUS; SUBCUTANEOUS
Status: DISCONTINUED | OUTPATIENT
Start: 2023-03-27 | End: 2023-03-28 | Stop reason: HOSPADM

## 2023-03-27 RX ORDER — BISACODYL 10 MG
10 SUPPOSITORY, RECTAL RECTAL DAILY PRN
Status: DISCONTINUED | OUTPATIENT
Start: 2023-03-27 | End: 2023-03-28 | Stop reason: HOSPADM

## 2023-03-27 RX ORDER — MISOPROSTOL 200 UG/1
TABLET ORAL
Status: DISCONTINUED
Start: 2023-03-27 | End: 2023-03-27 | Stop reason: HOSPADM

## 2023-03-27 RX ORDER — LIDOCAINE HYDROCHLORIDE 10 MG/ML
INJECTION, SOLUTION EPIDURAL; INFILTRATION; INTRACAUDAL; PERINEURAL
Status: DISCONTINUED
Start: 2023-03-27 | End: 2023-03-27 | Stop reason: WASHOUT

## 2023-03-27 RX ORDER — OXYTOCIN/0.9 % SODIUM CHLORIDE 30/500 ML
340 PLASTIC BAG, INJECTION (ML) INTRAVENOUS CONTINUOUS PRN
Status: DISCONTINUED | OUTPATIENT
Start: 2023-03-27 | End: 2023-03-28 | Stop reason: HOSPADM

## 2023-03-27 RX ORDER — HYDROCORTISONE 25 MG/G
CREAM TOPICAL 3 TIMES DAILY PRN
Status: DISCONTINUED | OUTPATIENT
Start: 2023-03-27 | End: 2023-03-28 | Stop reason: HOSPADM

## 2023-03-27 RX ORDER — MODIFIED LANOLIN
OINTMENT (GRAM) TOPICAL
Status: DISCONTINUED | OUTPATIENT
Start: 2023-03-27 | End: 2023-03-28 | Stop reason: HOSPADM

## 2023-03-27 RX ORDER — CARBOPROST TROMETHAMINE 250 UG/ML
250 INJECTION, SOLUTION INTRAMUSCULAR
Status: DISCONTINUED | OUTPATIENT
Start: 2023-03-27 | End: 2023-03-28 | Stop reason: HOSPADM

## 2023-03-27 RX ORDER — OXYTOCIN 10 [USP'U]/ML
10 INJECTION, SOLUTION INTRAMUSCULAR; INTRAVENOUS
Status: DISCONTINUED | OUTPATIENT
Start: 2023-03-27 | End: 2023-03-28 | Stop reason: HOSPADM

## 2023-03-27 RX ORDER — ACETAMINOPHEN 325 MG/1
650 TABLET ORAL EVERY 4 HOURS PRN
Status: DISCONTINUED | OUTPATIENT
Start: 2023-03-27 | End: 2023-03-28 | Stop reason: HOSPADM

## 2023-03-27 RX ORDER — NALOXONE HYDROCHLORIDE 0.4 MG/ML
0.4 INJECTION, SOLUTION INTRAMUSCULAR; INTRAVENOUS; SUBCUTANEOUS
Status: DISCONTINUED | OUTPATIENT
Start: 2023-03-27 | End: 2023-03-28 | Stop reason: HOSPADM

## 2023-03-27 RX ORDER — TRANEXAMIC ACID 10 MG/ML
1 INJECTION, SOLUTION INTRAVENOUS EVERY 30 MIN PRN
Status: DISCONTINUED | OUTPATIENT
Start: 2023-03-27 | End: 2023-03-28 | Stop reason: HOSPADM

## 2023-03-27 RX ORDER — OXYTOCIN 10 [USP'U]/ML
INJECTION, SOLUTION INTRAMUSCULAR; INTRAVENOUS
Status: DISCONTINUED
Start: 2023-03-27 | End: 2023-03-27 | Stop reason: WASHOUT

## 2023-03-27 RX ORDER — METHYLERGONOVINE MALEATE 0.2 MG/ML
200 INJECTION INTRAVENOUS
Status: DISCONTINUED | OUTPATIENT
Start: 2023-03-27 | End: 2023-03-28 | Stop reason: HOSPADM

## 2023-03-27 RX ORDER — IBUPROFEN 800 MG/1
800 TABLET, FILM COATED ORAL EVERY 6 HOURS PRN
Status: DISCONTINUED | OUTPATIENT
Start: 2023-03-27 | End: 2023-03-28 | Stop reason: HOSPADM

## 2023-03-27 RX ADMIN — Medication 340 ML/HR: at 09:21

## 2023-03-27 RX ADMIN — ACETAMINOPHEN 650 MG: 325 TABLET ORAL at 13:54

## 2023-03-27 RX ADMIN — FENTANYL CITRATE 50 MCG: 50 INJECTION, SOLUTION INTRAMUSCULAR; INTRAVENOUS at 09:25

## 2023-03-27 RX ADMIN — ACETAMINOPHEN 650 MG: 325 TABLET ORAL at 20:31

## 2023-03-27 RX ADMIN — DOCUSATE SODIUM 100 MG: 100 CAPSULE, LIQUID FILLED ORAL at 13:50

## 2023-03-27 RX ADMIN — DOCUSATE SODIUM 100 MG: 100 CAPSULE, LIQUID FILLED ORAL at 20:31

## 2023-03-27 RX ADMIN — IBUPROFEN 800 MG: 800 TABLET, FILM COATED ORAL at 18:24

## 2023-03-27 RX ADMIN — IBUPROFEN 800 MG: 800 TABLET, FILM COATED ORAL at 10:27

## 2023-03-27 ASSESSMENT — ACTIVITIES OF DAILY LIVING (ADL)
ADLS_ACUITY_SCORE: 24
ADLS_ACUITY_SCORE: 20
ADLS_ACUITY_SCORE: 20
ADLS_ACUITY_SCORE: 24
ADLS_ACUITY_SCORE: 24
ADLS_ACUITY_SCORE: 20
ADLS_ACUITY_SCORE: 24
ADLS_ACUITY_SCORE: 20
ADLS_ACUITY_SCORE: 24
ADLS_ACUITY_SCORE: 20

## 2023-03-27 NOTE — PLAN OF CARE
Patient arrived to [facility] unit via wheelchair with belongings, accompanied by spouse and RN, with infant in arms. Got report from ELENA Gates and checked bands. Unit and room oriented complete. No concerns at this time. Continue with plan of care.

## 2023-03-27 NOTE — PLAN OF CARE
Thanh is feeling more pressure and wanting to push. Dr. Beto Clayton and Dr. Siddiqi in room. Pt is complete and started pushing.  Baby girl delivered at 0917. Apgars 8,9. Mom, dad and baby are bonding well.

## 2023-03-27 NOTE — PROVIDER NOTIFICATION
Notified Dr. Beto Clayton that patient is feeling more pressure. Cervix has an anterior lip. Request for provider to assess.

## 2023-03-27 NOTE — PLAN OF CARE
Data: Vital signs within normal limits. Postpartum checks within normal limits - see flow record. Patient eating and drinking normally. Patient able to empty bladder independently and is up ambulating. No apparent signs of infection. healing well. Patient performing self cares and is able to care for infant.  Action: Patient medicated during the shift for pain and cramping. See MAR. Patient reassessed within 1 hour after each medication and pain was improved - patient stated she was comfortable. Patient education done about plan of care. See flow record.  Response: Positive attachment behaviors observed with infant. Support person, , Ali, present.   Plan: Anticipate discharge 3/28/23.

## 2023-03-27 NOTE — PROGRESS NOTES
Brief Progress Note     Feeling more uncomfortable. SVE 7/80/-1. Requesting fentanyl. , + accels, no decels, 2 in 10 contractions. Cat I tracing. Good cervical change. Plan for fentanyl and then continuous fetal monitoring. Anticipate .     Nhung Clayton MD MPH  OB/Gyn Resident PGY-2  3/27/2023  8:59 AM

## 2023-03-27 NOTE — PROGRESS NOTES
[facility]  Labor Progress Note    S: Per RN, patient is feeling more uncomfortable; now able to trace some ctx on TOCO. Despite previously agreed upon plan to start pitocin at 0300, patient continues to decline this intervention. Resident and RN discussed that, while the OB team continues to recommend starting pitocin, it is ok to continue to observe for progress now since we are tracing more ctx than prior.    O:   Patient Vitals for the past 24 hrs:   BP Temp Temp src Resp   23 2230 -- 98.6  F (37  C) Oral --   23 2130 -- 98  F (36.7  C) Oral --   23 120/81 98  F (36.7  C) Oral 18   23 1655 114/78 98.2  F (36.8  C) Oral 18     Intermittent Auscultation per patient request:   FHT: 130bpm, moderate variability, positive accels, intermittent small magnitude/duration varaible decels with some possible early decels  [place]: 2-3 contractions in 10 minutes        A/P:  Ms. Thanh Douglas is a 32 year old  at 40w4d with PROM (1600 3/26) - currently attempting to spontaneously labor. Pregnancy complicated by anemia, and late transfer of care from [facility].     PROM  Discussed with patient recommendation to start Pitocin, particularly in light of mild irregular contractions and lack of cervical change. She and her  initially ask to wait for 12h after PROM to perform next SVE and assess for cervical change. We reviewed risk of prolonged PROM without labor, including risk of infection. Patient and her  previously agreeable to starting Pitocin at 0300 if her contractions have not gotten significantly more regular or painful, but now starting to feel more unconfrotable. She and her  are aware that expectant management would not be recommended if there were signs of infection or fetal distress. Plan to repeat SVE prior to morning signout and start pitocin if there has not yet been significant cervical change.   - GBS negative, s/p PROM - no PCN needed    - Pain: Does not want Epidural   - Anticipate      Nhung Palacios MD, PGY-2  3:15 AM  [facility] Obstetrics & Gynecology Residency

## 2023-03-27 NOTE — DISCHARGE SUMMARY
[facility] Discharge Summary    Thanh Douglas MRN# [id number]   Age: 32 year old YOB: 1990     Date of Admission:  3/26/2023  Date of Discharge:  23   Admitting Physician:  Kristan Jennings MD  Discharge Physician:  Mojgan Espinoza MD     Admit Dx:   - , Intrauterine pregnancy at 40w4d   - Anemia    Discharge Dx:  - Same as above, now  s/p     Procedures:  - Spontaneous vaginal delivery    Consults:  - Social work    Admit HPI:  The patient reports that her water broke at 4 PM today 3/26. She noticed a gush of clear fluid, has continued to leak fluid and has continued to saturate her underwear. Has some occasional contractions But is not really having any painful contractions. No vaginal bleeding, feeling normal fetal movement.  She denies headache, vision changes, chest pain, shortness of breath, fever, chills, nausea, vomiting or other systemic complaints.    Please see her Admission H&P and Delivery Summary for further details.    Delivery :  Thanh Douglas is a 32 year old  at 40w4d who presented to L&D in spontaneous labo after PROM at 1600 on 3/26r. Pregnancy was notable for late LAURA from [place] and anemia but otherwise uncomplicated.      Upon admission, SVE was 2.5/60/-3. She had nothing for pain control. Her labor progressed spontaneously without augmentation. The patient progressed to complete and pushed over the course of two contractions. At 0917, she had an  of a liveborn female infant on 3/27/2023. The infant head was delivered in OLENA position. There were no nuchal cords, there was one body cord that was loose and delivered through. Tone and cry were good upon delivery, Apgars and weight pending. The cord was allowed to pulse for 1 minute and was then clamped and cut. The infant was then placed on the patient's abdomen for immediate skin-to-skin. Routine cord blood was obtained. IV pitocin was started for active management of the  third stage. Gentle downward traction was applied to assist in delivery of the placenta. The cord was avulsed when the placenta was partially through the cervix and it was gently grasped in the vagina to complete the delivery. The placenta was found to be intact with a three vessel cord. She had no perineal lacerations and one right labia laceration that was hemostatic and so was not repaired. Upon final inspection, there was good hemostasis and uterine tone was firm. Instrument and sharp count was correct.  mL.    Postpartum Course:  Her postpartum course was uncomplicated. On PPD#1, she was meeting all of her postpartum goals and deemed stable for discharge. She was voiding without difficulty, tolerating a regular diet without nausea and vomiting, her pain was well controlled on oral pain medicines and her lochia was appropriate.     Her hemoglobin prior to delivery was 11.1 - she had no signs of blood loss anemia at discharge. Her Rh status was positive, and Rhogam was not indicated.     Discharge Medications:     Review of your medicines      START taking      Dose / Directions   acetaminophen 325 MG tablet  Commonly known as: TYLENOL  Used for: Labor and delivery, indication for care      Dose: 650 mg  Take 2 tablets (650 mg) by mouth every 6 hours as needed for mild pain Start after Delivery.  Quantity: 100 tablet  Refills: 0     ibuprofen 600 MG tablet  Commonly known as: ADVIL/MOTRIN  Used for: Labor and delivery, indication for care      Dose: 600 mg  Take 1 tablet (600 mg) by mouth every 6 hours as needed for moderate pain (4-6) Start after delivery  Quantity: 60 tablet  Refills: 0     senna-docusate 8.6-50 MG tablet  Commonly known as: SENOKOT-S/PERICOLACE  Used for: Labor and delivery, indication for care      Dose: 1 tablet  Take 1 tablet by mouth daily Start after delivery.  Quantity: 100 tablet  Refills: 0        CONTINUE these medicines which have NOT CHANGED      Dose / Directions   prenatal  multivitamin w/iron 27-0.8 MG tablet  Used for: Prenatal care, subsequent pregnancy, unspecified trimester      Dose: 1 tablet  Take 1 tablet by mouth daily  Quantity: 90 tablet  Refills: 3           Where to get your medicines      These medications were sent to [place], [place] - [address]  [address] [facility] [address], [facility] [address]    Phone: [phone]     acetaminophen 325 MG tablet    ibuprofen 600 MG tablet    senna-docusate 8.6-50 MG tablet         Discharge/Disposition:  Thanh Douglas was discharged to home in stable condition with the following instructions/medications:  1) Call for temperature > 100.4, bright red vaginal bleeding >1 pad an hour x 2 hours, foul smelling vaginal discharge, pain not controlled by usual oral pain meds, persistent nausea and vomiting not controlled on medications  2) For feeding she decided to breast feed.  3) She was instructed to follow-up with her primary OB in 6 weeks for a routine postpartum visit   4) Discharge activity: Pelvic rest for 6 weeks.    Nissa Nava MD   OB/GYN PGY-3  03/28/23 8:00 AM

## 2023-03-27 NOTE — DISCHARGE INSTRUCTIONS
Vaginal Delivery Discharge Instructions: [facility]  [place]:   Waydiiso qoyska iyo saaxibadaa inay ku caawiyaan markaad u baahantahay.  Waxba cheney kor saarin ama cheney galin farjigaaga ilaa uu dhakhtarkaagu ansixiyo.  Si fudud u qaado dhowrka asbuuc e xiga si aad u ogalaato in jirkaagu harini kabto. Waxaad samayn kartaa howl kasta oo aad rabto ilaa meeshaas laga gaarayo.  Gaadhi cheney wadin markaad qaadanayso  kaniiniyada xanuunka ee dhkhatarku kuu qoray . waxaad gaadhi wadi kartaa haddii aad qaadanayso kaniiniyada xanuunka ee koontarka.    Wac bixiyahaaga daryeelka caafimaaad haddii aad qabtid mid ka mid ah calaamadahan harini socda:  Haddii suufka dhiigga nuuga uu ku buuxsamo 1 saac gudihiis, ama aad aragto xinjiro dhiig ah oo ka wayn kubadda golafka.   Dhiig soconaya wax kabadan 6 asbuuc.  Haddii aad qabto dheecaan farjiga ka imaanaya oo  si xun u uraya.   [facility]  F (38  C) am aka sii saraysa (heerkulka laga qaaday carabka hoostiiisa), oo qarqaryo leh ama aan lahayn   Xanuun, nabar, majiirid daran oo aad ka dareeto qaybta hoose ee ubucda.  Xanuun sii kordya, barar, guduudasho ama dheecaan ka dhiimaya meesha la tolay ee qaliinka.  Kaadi badan oo dagdag ah oo markasta ku qabanaysa , ama gubasho aad dareento markaad kaajayso.  Guduudasho, barar, ama xanuun aad ka dareento xididada lugta.  Dhibaato kaa haysata naas nuujinta, ama guduudasho ama xanuun naaska ah.  Xanuun sii kordhaya ama aan ka dhamaanayn meesha la tolay ama danqashada dilaaca.  Lalabbo ama matag.  Xabad xanuun iyo qufac ama naqaska oo kugu jatindergaya.  Dhibaato ay la socoto murugo, ondina, aa geo.   Haddii aad qabtid wax geo gamez oo ku saabsan inaad waxyeelayso naftaada ama ilmaha, wac jatinderkhtasuzanna triston alesiaiiba.   Haddii aad qabto awad aalo amai winslowa sharla noqoto kadib.    Gacmahaagga nadiifi:  Markasta dhaqa gacahaaga ka hor inta aadan taaban farjiga agagaarkiisa  iyo meesha la tolay. Kevin waxay caawiniaysaa inuu yaraado infaksrikiku. Hdii  isadoraagu anjum jackson tiffaniedaniel magdaleno tirtirtwila morales nadiidelilah chiu. Luis Albertojadakalin nadiifi ooo jar.      Vaginal Delivery Discharge Instructions  Activity:   Ask family and friends for help when you need it.  Do not place anything in your vagina until your doctor approves.  Take it easy for the next few weeks to allow your body to recover. You may do any activities you feel up to at that point.  Do not drive while taking pain pills prescribed by your doctor. You may drive if taking over-the-counter pain pills.    Call your health care provider if you have any of these symptoms:  You soak a sanitary pad with blood within 1 hour, or you see blood clots larger than a golf ball.  Bleeding that lasts more than 6 weeks.  You have vaginal discharge that smells bad.   A fever of 100.4  F (38  C) or higher (temperature taken under your tongue), with or without chills   Severe, pain, cramping or tenderness in your lower belly area.  Increased pain, swelling, redness or fluid around your stitches.  A more frequent or urgent need to urinate (pee), or it burns when you pee.  Redness, swelling or pain around a vein in your leg.  Problems breastfeeding, or a red or painful area on your breast.  Pain that increases or does not go away from an episiotomy or perineal tear.  Nausea and vomiting.  Chest pain and cough or are gasping for air.  Problems coping with sadness, anxiety, or depression.   If you have any concerns about hurting yourself or the baby, call your doctor right away.    You have questions or concerns after you return home.    Keep your hands clean:  Always wash your hands before touching your perineal area and stitches.  This helps reduce your risk of infection.  If your hands aren t dirty, you may use an alcohol hand-rub to clean your hands. Keep your nails clean and short.        Postpartum Vaginal Delivery Instructions    Activity     Ask family and friends for help  when you need it.  Do not place anything in your vagina for 6 weeks.  You are not restricted on other activities, but take it easy for a few weeks to allow your body to recover from delivery.  You are able to do any activities you feel up to that point.  No driving until you have stopped taking your pain medications (usually two weeks after delivery).     Call your health care provider if you have any of these symptoms:     Increased pain, swelling, redness, or fluid around your stiches from an episiotomy or perineal tear.  A fever above 100.4 F (38 C) with or without chills when placing a thermometer under your tongue.  You soak a sanitary pad with blood within 1 hour, or you see blood clots larger than a golf ball.  Bleeding that lasts more than 6 weeks.  Vaginal discharge that smells bad.  Severe pain, cramping or tenderness in your lower belly area.  A need to urinate more frequently (use the toilet more often), more urgently (use the toilet very quickly), or it burns when you urinate.  Nausea and vomiting.  Redness, swelling or pain around a vein in your leg.  Problems breastfeeding or a red or painful area on your breast.  Chest pain and cough or are gasping for air.  Problems coping with sadness, anxiety, or depression.  If you have any concerns about hurting yourself or the baby, call your provider immediately.   You have questions or concerns after you return home.     Keep your hands clean:  Always wash your hands before touching your perineal area and stitches.  This helps reduce your risk of infection.  If your hands aren't dirty, you may use an alcohol hand-rub to clean your hands. Keep your nails clean and short.        Preeclampsia   Call your doctor right away if you have any of the following:  - Edema (swelling) in your face or hands  - Rapid weight gain-about 1 pound or more in a day  - Headache  - Abdominal pain on your right side  - Vision problems (flashes or spots)  - You have questions or  concerns once you return home.

## 2023-03-27 NOTE — PROGRESS NOTES
[facility]  FHT Strip Review Note    Discussed care plan with the patient's nurse - the patient does not want IV Pitocin right now. Continues to have painful regular contractions, per RN report. On IA, willing to be on for an NST to assess how frequently contractions are occurring. If spaced out, will re-address IV Pitocin.     O:   Patient Vitals for the past 4 hrs:   Temp Temp src   23 2130 98  F (36.7  C) Oral     Intermittent Auscultation per patient request:   FHT: 135bpm, moderate variability, positive accels, no decels   [place]: Not tracing well, patient laying on side. Possibly 1-2 in 10.     A/P:  Ms. Thanh Douglas is a 32 year old  at 40w3d with PROM (1600 3/26) - currently attempting to spontaneously labor. Pregnancy complicated by anemia, and late transfer of care from [facility].     PROM  At last cervical exam, no change from PROM (1600) to check (2230). Patient continues to request awaiting spontaneous labor. Currently on IA. To place on for NST at this time to assess frequency of contractions. If ctx spaced, will again discuss Pitocin augmentation. Previously discussed risk of not augmenting after PROM, particularly in the setting of no cervical change over numerous hours of contractions. Noted that risk of infection of primary concern with prolonged rupture of membranes. Patient voiced understanding of this, and wanted to again wait. Will readdress pending tocometry.   - GBS negative, s/p PROM - no PCN needed   - Pain: Does not want Epidural   - Anticipate      Nissa Nava MD  OB/GYN PGY-3  23 12:52 AM

## 2023-03-27 NOTE — PROGRESS NOTES
[facility]  Labor Progress Note    S:  Patient feeling more uncomfortable with contractions, was not having any when she first got here. Noticing them twice every 10 minutes.     O:   Patient Vitals for the past 4 hrs:   BP Temp Temp src Resp   23 -- 98  F (36.7  C) Oral --   23 120/81 98  F (36.7  C) Oral 18   Gen: Well appearing, laying on side, occasionally grimaces with contractions   SVE: 2-3/60/-3, unchanged from prior     Intermittent Auscultation per patient request:   FHT: 135bpm. NST at 2030 with accelerations present, no decels,    [place]: At 2030 no contractions     A/P:  Ms. Thanh Douglas is a 32 year old  at 40w3d with PROM (1600 3/26) - currently attempting to spontaneously labor. Pregnancy complicated by anemia, and late transfer of care from [facility].     PROM  No cervical change over 6h while in unit. Suggested that we begin Pitocin to further augment given lack of cervical change. She would like to again wait one hour and then re-discuss IOL further. She is currently on intermittent fetal monitoring - would like her to remain on continuously if she is being augmented.   - Labor note 0000 to discuss augmentation   - GBS negative, s/p PROM - no PCN needed   - Pain: Does not want Epidural   - Anticipate      Nissa Nava MD  OB/GYN PGY-3  23 10:45 PM

## 2023-03-27 NOTE — PROVIDER NOTIFICATION
23 0559   Vaginal Exam   Method sterile exam per physician   Cervical Dilation (cm) 5   Cervical Effacement 70%   Fetal Presentation cephalic   Fetal Station -1       Provider at bedside to perform SVE and reevaluate. SVE . Will hold off on starting pitocin. Pt uncomfortable with contractions but coping well. VSS. FHR reactive. Plan for .

## 2023-03-27 NOTE — PROGRESS NOTES
[facility]  Labor Progress Note    S: Contractions more painful now. Comfortable with SVE.     O:   Patient Vitals for the past 24 hrs:   BP Temp Temp src Resp   23 0544 -- 98  F (36.7  C) Oral --   23 0450 110/62 98.4  F (36.9  C) Oral 18   23 0330 -- 98.4  F (36.9  C) Oral --   23 0230 131/70 98.7  F (37.1  C) Oral 18   230 -- 98.6  F (37  C) Oral --   23 -- 98  F (36.7  C) Oral --   23 120/81 98  F (36.7  C) Oral 18   23 1655 114/78 98.2  F (36.8  C) Oral 18   Gen: Well appearing, uncomfortable with contractions   SVE: 5/-1, forebag palpable, patient declined AROM of this     Intermittent Auscultation per patient request:   FHT: 130bpm, moderate variability, positive accels, intermittent small magnitude/duration varaible decels with some possible early decels  [place]: 2-3 contractions in 10 minutes    A/P:  Ms. Thanh Douglas is a 32 year old  at 40w4d with PROM (1600 3/26) - now in spontaneous labor. Pregnancy complicated by anemia, and late transfer of care from [facility].     Spontaneous Labor s/p PROM   Spontaneously progressing, no augmentation needed at this time. IA for FHT appropriate if not receiving induction agents.   - GBS negative, s/p PROM - no PCN needed   - Pain: Does not want Epidural   - Anticipate      Nissa Nava MD PGY-3  OB/GYN PGY-3  23 6:10 AM                     none

## 2023-03-27 NOTE — PLAN OF CARE
Data: Thanh Douglas transferred to 7144 via wheelchair at 1145. Baby transferred via parent's arms.  Action: Receiving unit notified of transfer: Yes. Patient and family notified of room change. Report given to Venus at 1115. Belongings sent to receiving unit. Accompanied by Registered Nurse. Oriented patient to surroundings. Call light within reach. ID bands double-checked with receiving RN.  Response: Patient tolerated transfer and is stable

## 2023-03-27 NOTE — L&D DELIVERY NOTE
DELIVERY SUMMARY    Delivery Note:  Thanh Douglas is a 32 year old  at 40w4d who presented to L&D in spontaneous labo after PROM at 1600 on 3/26r. Pregnancy was notable for late LAURA from [place] and anemia but otherwise uncomplicated.     Upon admission, SVE was 2.5/60/-3. She had nothing for pain control. Her labor progressed spontaneously without augmentation. The patient progressed to complete and pushed over the course of two contractions. At 0917, she had an  of a liveborn female infant on 3/27/2023. The infant head was delivered in OLENA position. There were no nuchal cords, there was one body cord that was loose and delivered through. Tone and cry were good upon delivery, Apgars and weight pending. The cord was allowed to pulse for 1 minute and was then clamped and cut. The infant was then placed on the patient's abdomen for immediate skin-to-skin. Routine cord blood was obtained. IV pitocin was started for active management of the third stage. Gentle downward traction was applied to assist in delivery of the placenta. The cord was avulsed when the placenta was partially through the cervix and it was gently grasped in the vagina to complete the delivery. The placenta was found to be intact with a three vessel cord. She had no perineal lacerations and one right labia laceration that was hemostatic and so was not repaired. Upon final inspection, there was good hemostasis and uterine tone was firm. Instrument and sharp count was correct.  mL.    Dr. Siddiqi was present for delivery    Nhung Clayton MD, MPH  Obstetrics and Gyncology, PGY-2  2023 , 9:39 AM      Physician Attestation  I was present for the entire delivery, including baby and placenta as well as perineal laceration evaluation.    Sharon Siddiqi  Date of Service (when I saw the patient): 23        Thanh Douglas MRN# [id number]   Age: 32 year old YOB: 1990     [place] Assessment Tool Data    Gestational  Age:  Gestational Age: 40w4d     Maternal temperature range:  Temp  Av.4  F (36.9  C)  Min: 98  F (36.7  C)  Max: 99.1  F (37.3  C)    Membranes ruptured for:   (Delivered) Hours: 17 Minutes: 17     GBS status:  Lab Results   Component Value Date    GBS Negative 08/10/2018       Antibiotic Status:  Antibiotics       IV Antibiotic Given       Additional Management     Fetal Status Prior to  Delivery     Fetal Status Comments        Sepsis Prebirth Score:      Sepsis Postbirth Score:      Determination based on clinical exam after birth:      Disposition:          Meaghan Douglas-Thanh [[id number]]    Labor Events     labor?: No  Labor Type: Spontaneous     Rupture date/time: 3/26/23 1600   Rupture type: Spontaneous rupture of membranes occuring during spontaneous labor or augmentation  Fluid color: Clear  Fluid odor: Normal     Augmentation: None     Delivery/Placenta Date and Time    Delivery Date: 3/27/23 Delivery Time:  9:17 AM    Other personnel present at delivery:  Provider Role   Sharon Siddiqi MD Ayers Looby, Anna, MD          Apgars    Living status: Living   1 Minute 5 Minute 10 Minute 15 Minute 20 Minute   Skin color:        Heart rate:        Reflex irritability:        Muscle tone:        Respiratory effort:        Total:           Cord    Vessels: 3 Vessels    Cord Complications: None               Gases Sent?: No    Delayed cord clamping?: Yes    Cord Clamping Delay (seconds): 31-60 seconds       Labor Events and Shoulder Dystocia    Shoulder dystocia present?: Neg     Delivery (Maternal) (Provider to Complete) ([id number])    Episiotomy: None  Perineal lacerations: None    Labial laceration: right Repaired?: No   Repair suture: None  Genital tract inspection done: Pos     Blood Loss  Mother: Thanh Douglas #[id number]   Start of Mother's Information    Delivery Blood Loss  237 - 23 0939    None           End of Mother's Information  Mother: Thanh Douglas  #[id number]          Delivery - Provider to Complete (676574)    Delivery Type (Choose the 1 that will go to the Birth History): Vaginal, Spontaneous                   Other personnel:  Provider Role   Sharon Siddiqi MD Ayers Looby, Anna, MD                 Placenta    Removal: Spontaneous, Manual Removal  Comments: cord avulsed with placenta partially   Disposition: Hospital disposal           Anesthesia    Method: None                Presentation and Position    Presentation: Vertex    Position: Left Occiput Anterior                 Nhung Clayton MD

## 2023-03-27 NOTE — PROGRESS NOTES
[facility]  FHT Strip Review Note    S: Patient feeling occasional contractions. Does not want Pitocin. States she wants to have a natural labor.     O:   Patient Vitals for the past 4 hrs:   Temp Temp src   23 2230 98.6  F (37  C) Oral   23 2130 98  F (36.7  C) Oral     Intermittent Auscultation per patient request:   FHT: 135bpm, moderate variability, positive accels, no decels   [place]: Not tracing well. In room, 1 ctx in 10 minutes, RN and I adjust toco but too mild to palpate or  on toco    A/P:  Ms. Thanh Douglas is a 32 year old  at 40w3d with PROM (1600 3/26) - currently attempting to spontaneously labor. Pregnancy complicated by anemia, and late transfer of care from [facility].     PROM  Discussed with patient recommendation to start Pitocin, particularly in light of mild irregular contractions and lack of cervical change. She and her  initially ask to wait for 12h after PROM to perform next SVE and assess for cervical change. We reviewed risk of prolonged PROM without labor, including risk of infection. Patient and her  agreeable to starting Pitocin at 0300 if her contractions have not gotten significantly more regular or painful. She and her  are aware that expectant management would not be recommended if there were signs of infection or fetal distress. RN to start Pitocin at 0300.   - GBS negative, s/p PROM - no PCN needed   - Pain: Does not want Epidural   - Anticipate      Nissa Nava MD  OB/GYN PGY-3  23 1:17 AM

## 2023-03-28 VITALS
TEMPERATURE: 98.6 F | HEART RATE: 99 BPM | SYSTOLIC BLOOD PRESSURE: 104 MMHG | DIASTOLIC BLOOD PRESSURE: 56 MMHG | RESPIRATION RATE: 16 BRPM

## 2023-03-28 LAB — HGB BLD-MCNC: 10 G/DL (ref 11.7–15.7)

## 2023-03-28 PROCEDURE — 85018 HEMOGLOBIN: CPT

## 2023-03-28 PROCEDURE — 36415 COLL VENOUS BLD VENIPUNCTURE: CPT

## 2023-03-28 PROCEDURE — 250N000013 HC RX MED GY IP 250 OP 250 PS 637

## 2023-03-28 RX ORDER — AMOXICILLIN 250 MG
1 CAPSULE ORAL DAILY
Qty: 100 TABLET | Refills: 0 | Status: SHIPPED | OUTPATIENT
Start: 2023-03-28

## 2023-03-28 RX ORDER — ACETAMINOPHEN 325 MG/1
650 TABLET ORAL EVERY 6 HOURS PRN
Qty: 100 TABLET | Refills: 0 | Status: SHIPPED | OUTPATIENT
Start: 2023-03-28

## 2023-03-28 RX ORDER — IBUPROFEN 600 MG/1
600 TABLET, FILM COATED ORAL EVERY 6 HOURS PRN
Qty: 60 TABLET | Refills: 0 | Status: SHIPPED | OUTPATIENT
Start: 2023-03-28

## 2023-03-28 RX ADMIN — IBUPROFEN 800 MG: 800 TABLET, FILM COATED ORAL at 13:22

## 2023-03-28 RX ADMIN — ACETAMINOPHEN 650 MG: 325 TABLET ORAL at 13:22

## 2023-03-28 ASSESSMENT — ACTIVITIES OF DAILY LIVING (ADL)
ADLS_ACUITY_SCORE: 24

## 2023-03-28 NOTE — PLAN OF CARE
Data: Vital signs within normal limits. Postpartum checks within normal limits - see flow record. Patient eating and drinking normally. Patient able to empty bladder independently and is up ambulating. No apparent signs of infection.     healing well. Patient performing self cares and is able to care for infant.  Action: Patient not medicated during the shift. See MAR.  patient stated she was comfortable. Patient education done about plan of care. See flow record.  Response: Positive attachment behaviors observed with infant. Support person present.   Plan: Anticipate discharge on 3/28/23.

## 2023-03-28 NOTE — PROGRESS NOTES
OB Postpartum Progress Note  PPD#1 s/p     S: Feeling well this morning. Pain well controlled. Lochia improving and scant. Tolerating a regular diet without nausea or emesis. Ambulating without dizziness or lightheadedness. Voiding spontaneously without issues. Breast feeding.     Denies fevers, chills, headache, vision changes, CP, SOB, RUQ pain, increased edema, severe cramping, or heavy bleeding.    O:  Patient Vitals for the past 24 hrs:   BP Temp Temp src Pulse Resp   23 0411 98/53 98.4  F (36.9  C) Oral 85 16   23 1546 94/58 99  F (37.2  C) Oral 94 16   23 1153 135/76 98.6  F (37  C) Oral 92 16   23 1115 115/65 -- -- -- 23 1100 128/71 -- -- -- 23 1045 123/64 -- -- -- 23 1030 100/68 98.2  F (36.8  C) Oral -- 16   23 1015 108/66 -- -- -- 23 1000 112/65 -- -- -- 23 0945 114/66 -- -- -- 23 0930 125/68 -- -- -- 23 0842 -- 99  F (37.2  C) Oral -- --   23 0745 122/74 99.1  F (37.3  C) Oral -- 23 0735 -- -- -- 88 --   23 0623 115/70 -- -- -- --   23 0544 -- 98  F (36.7  C) Oral -- --     Gen: in NAD  CV: Regular rate, well perfused  Resp: Non-labored breathing on room air  Abd: Soft, non-tender, fundus firm below the umbilicus and nontender  Ext: Trace lower extremity edema bilaterally, calves non-tender    A/P: Thanh Douglas is a 32 year old  who is PPD#1 s/p . Pregnancy notable for late LAURA. Doing well in the postpartum period.    # Postpartum Care  - Pain: Continue PRN acetaminophen and ibuprofen  - Heme: Appropriate blood loss from delivery. No s/s of anemia. Hgb 11.1 >    - GI: PRN bowel regimen, anti-emetics  - : Voiding spontaneously   - PNC: Rh positive, Rubella immune. No interventions indicated.   - Breast feeding; no issues  - Contraception: Not discussed   - PPx: Encourage ambulation, IS, SCDs while confined to bed    Dispo: Anticipate discharge PPD#1-2.  Meeting goals today.     Nissa Nava MD   OB/GYN PGY-3  03/28/23 7:59 AM    Patient seen and examined by me, agree with above resident note. Overall doing well and meeting goals.  Stable for discharge.  Instructions given, RTC 6 weeks    CLARENCE HARP MD

## 2023-03-28 NOTE — CONSULTS
[facility]  MATERNAL CHILD HEALTH   INITIAL PSYCHOSOCIAL ASSESSMENT     DATA:     Presenting Information: Mom, Thanh, is a 32 year old  who delivered a girl , still working on a name, on 3/27/2023 at Gestational Age: <None> in the setting of  . SW was consulted for Thanh is new to the country and potentially needing resources.    Living Situation: Parents, Thanh and Tevin, are , who live together in [place]. This is their 3rd child, but two of them are back in [facility]. She shared that they have been in the country since February.     Social Support: Thanh has friends and family in the area.    Education/Employment: Thanh does not work, but her  is a .     Insurance: medicaid     Source of Financial Support: parental employment and county assistance    Mental Health History: Thanh denies a history of MH and states that her mood was good over her pregnancy.     History of Postpartum Mood Disorders: No history per pt     Chemical Health History: No history per pt         Legal/Child Protection Involvement: No history per pt     INTERVENTION:       Chart review    Collaboration with team: ELENA Donovan     Conducted Psychosocial Assessment    Introduction to Maternal Child Health SW role and scope of practice    Validated emotions and provided supportive listening    Provided psychoeducation on  mood and anxiety disorders    SW described process for birth certificate, social security card and insurance process.     Provided resources and referrals -     MADALYN talked with Thanh about referrals for services and basic resource needs. She declined any support from SW     ASSESSMENT:     Coping: Thanh is coping well with the birth of her new daughter. She shared that they moved form [facility] about a month ago. They have good family support in the area. She shared that she does not have any resource needs at this time.      (emotional/coping skills,  integration of information, engagement with SW/staff, medical understanding, observed family interactions)    Assessment of parental risk for PMAD: No history per pt    Risk Factors: hospitalization during a global pandemic and unexpected hospitalization     Resiliency Factors & Strengths: local family, experienced parents, strong social support, parental employment, stable housing and reliable transportation    PLAN:     SW will continue to follow for supportive intervention.  GEOVANNA Mccormick, [facility]  Maternal and Child Health   Office: [phone]  Pager: [phone]  After Hours Pager: [phone]  [email or web address]

## 2023-03-28 NOTE — PLAN OF CARE
Patient discharged. All education reviewed, questions addressed, medication reviewed and verified. EDS 2, BC completed, Videos reviewed. ROP completed. Has a Received Pump.

## 2023-03-28 NOTE — PLAN OF CARE
VSS, postpartum assessments WNL. She is voiding without difficulty, up ad arcelia, eating and drinking without nausea. Incision/perineum appears to be healing well, lochia WNL, no s/s infection. Breastfeeding infant/Pumping with min assistance. Taking **tylenol and ibuprofen for pain and Pt reports adequte pain management.    Pt is agreeable with her plan of care. Positive attachment behaviors observed with infant. Support person spouse present.

## 2023-05-01 ENCOUNTER — MEDICAL CORRESPONDENCE (OUTPATIENT)
Dept: HEALTH INFORMATION MANAGEMENT | Facility: CLINIC | Age: 33
End: 2023-05-01
Payer: MEDICAID

## 2023-06-06 ENCOUNTER — DOCUMENTATION ONLY (OUTPATIENT)
Facility: CLINIC | Age: 33
End: 2023-06-06
Payer: MEDICAID

## 2023-07-15 ENCOUNTER — HEALTH MAINTENANCE LETTER (OUTPATIENT)
Age: 33
End: 2023-07-15

## 2024-09-07 ENCOUNTER — HEALTH MAINTENANCE LETTER (OUTPATIENT)
Age: 34
End: 2024-09-07

## 2025-04-25 ENCOUNTER — HOSPITAL ENCOUNTER (EMERGENCY)
Facility: CLINIC | Age: 35
End: 2025-04-25
Payer: MEDICAID

## (undated) RX ORDER — FENTANYL CITRATE-0.9 % NACL/PF 10 MCG/ML
PLASTIC BAG, INJECTION (ML) INTRAVENOUS
Status: DISPENSED
Start: 2023-03-26